# Patient Record
Sex: MALE | Race: WHITE | NOT HISPANIC OR LATINO | Employment: OTHER | URBAN - METROPOLITAN AREA
[De-identification: names, ages, dates, MRNs, and addresses within clinical notes are randomized per-mention and may not be internally consistent; named-entity substitution may affect disease eponyms.]

---

## 2017-04-05 ENCOUNTER — OFFICE VISIT (OUTPATIENT)
Dept: FAMILY MEDICINE | Facility: CLINIC | Age: 82
End: 2017-04-05
Payer: MEDICARE

## 2017-04-05 VITALS
DIASTOLIC BLOOD PRESSURE: 79 MMHG | WEIGHT: 206 LBS | OXYGEN SATURATION: 95 % | TEMPERATURE: 97.1 F | HEIGHT: 71 IN | SYSTOLIC BLOOD PRESSURE: 124 MMHG | HEART RATE: 80 BPM | BODY MASS INDEX: 28.84 KG/M2

## 2017-04-05 DIAGNOSIS — H61.23 BILATERAL IMPACTED CERUMEN: ICD-10-CM

## 2017-04-05 DIAGNOSIS — F03.B0 MODERATE DEMENTIA WITHOUT BEHAVIORAL DISTURBANCE (H): Primary | ICD-10-CM

## 2017-04-05 DIAGNOSIS — Z23 NEED FOR VACCINATION: ICD-10-CM

## 2017-04-05 PROCEDURE — 99202 OFFICE O/P NEW SF 15 MIN: CPT | Mod: 25 | Performed by: INTERNAL MEDICINE

## 2017-04-05 PROCEDURE — 90670 PCV13 VACCINE IM: CPT | Performed by: INTERNAL MEDICINE

## 2017-04-05 PROCEDURE — G0009 ADMIN PNEUMOCOCCAL VACCINE: HCPCS | Performed by: INTERNAL MEDICINE

## 2017-04-05 NOTE — PROGRESS NOTES
"  SUBJECTIVE:                                                    Raulito Iyer is a 86 year old male who presents to clinic today for the following health issues:      Chief Complaint   Patient presents with     [facility]         86 year old man who saw Dr. John once last summer according to his daughter.  He has moderate dementia according to his daughter.  He is moving to an [facility] and needs an admission form filled out.  He has no specific complaints.  His daughter thinks he had labs last year. We have no records today.      Problem list and histories reviewed & adjusted, as indicated.  Additional history: as documented    Patient Active Problem List   Diagnosis     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 behavioral disturbance     Past Surgical History:   Procedure Laterality Date     NO HISTORY OF SURGERY         Social History   Substance Use Topics     Smoking status: Former Smoker     Smokeless tobacco: Not on file     Alcohol use 1.8 - 3.6 oz/week     3 - 6 Standard drinks or equivalent per week     No family history on file.      Current Outpatient Prescriptions   Medication Sig Dispense Refill     NO ACTIVE MEDICATIONS         No Known Allergies    Reviewed and updated as needed this visit by clinical staff  Tobacco  Allergies  Meds  Problems  Soc Hx      Reviewed and updated as needed this visit by Provider         ROS:  An accurate ROS cannot be obtained due to memory loss     OBJECTIVE:                                                    /79  Pulse 80  Temp 97.1  F (36.2  C)  Ht 5' 11\" (1.803 m)  Wt 206 lb (93.4 kg)  SpO2 95%  BMI 28.73 kg/m2  Body mass index is 28.73 kg/(m^2).  GENERAL: healthy, alert and no distress  EYES: Eyes grossly normal to inspection, PERRL and conjunctivae and sclerae normal  HENT: ear canals and TM's normal after removal of bilateral cerumen impactions by myself, nose and mouth without ulcers or lesions  NECK: no adenopathy, no asymmetry, masses, or scars and " thyroid normal to palpation  RESP: lungs clear to auscultation - no rales, rhonchi or wheezes  CV: regular rate and rhythm, normal S1 S2, no S3 or S4, no murmur, click or rub, no peripheral edema and peripheral pulses strong  ABDOMEN: soft, nontender, no hepatosplenomegaly, no masses and bowel sounds normal  MS: no gross musculoskeletal defects noted, no edema  SKIN: no suspicious lesions or rashes  NEURO: Normal strength and tone, severe memory loss noted, repeats himself multiple times during the interview  PSYCH: Appropriate mood and affect, reasonably well-groomed, normal speech, severely impaired insight and judgment due to memory loss    Diagnostic Test Results:  none      ASSESSMENT/PLAN:                                                            1. 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 behavioral disturbance  Assisted living seems most appropriate, admission paperwork filled out    2. Bilateral impacted cerumen  For recurrent cerumen impactions, he should probably see otolaryngology since he has a great deal of difficulty remove cerumen in both external canals; contact information for Dr. Bales was provided to the patient's daughter  - OTOLARYNGOLOGY REFERRAL    3. Need for vaccination    - Pneumococcal vaccine 13 valent PCV13 IM (Prevnar) [71918]  - ADMIN: Vaccine, Initial ([id number])      I have requested records from [facility] internal medicine to insure that vaccinations are up-to-date  FUTURE APPOINTMENTS:       - Pending review of previous records and in no later than one year or sooner as needed    Sunny Yun MD  [facility]

## 2017-04-05 NOTE — MR AVS SNAPSHOT
After Visit Summary   4/5/2017    Raulito Iyer    MRN: [id number]           Patient Information     Date Of Birth          10/23/1930        Visit Information        Provider Department      4/5/2017 4:00 PM Sunny Yun MD [facility]        Today's Diagnoses     Bilateral impacted cerumen    -  1    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 behavioral disturbance           Follow-ups after your visit        Additional Services     OTOLARYNGOLOGY REFERRAL       Your provider has referred you to: N: [facility] [phone]   [email or web address]      Dr. Nelson Bales Ear specialist for wax removal       Please be aware that coverage of these services is subject to the terms and limitations of your health insurance plan.  Call member services at your health plan with any benefit or coverage questions.      Please bring the following with you to your appointment:    (1) Any X-Rays, CTs or MRIs which have been performed.  Contact the facility where they were done to arrange for  prior to your scheduled appointment.   (2) List of current medications  (3) This referral request   (4) Any documents/labs given to you for this referral                  Follow-up notes from your care team     Return in about 1 year (around 4/5/2018) for Physical Exam.      Who to contact     If you have questions or need follow up information about today's clinic visit or your schedule please contact [facility] directly at [phone].  Normal or non-critical lab and imaging results will be communicated to you by MyChart, letter or phone within 4 business days after the clinic has received the results. If you do not hear from us within 7 days, please contact the clinic through MyChart or phone. If you have a critical or abnormal lab result, we will notify you by phone as soon as possible.  Submit refill requests through [employer] or call your pharmacy and they  "will forward the refill request to us. Please allow 3 business days for your refill to be completed.          Additional Information About Your Visit        [employer] Information     [email or web address] lets you send messages to your doctor, view your test results, renew your prescriptions, schedule appointments and more. To sign up, go to [email or web address] . Click on \"Log in\" on the left side of the screen, which will take you to the Welcome page. Then click on \"Sign up Now\" on the right side of the page.     You will be asked to enter the access code listed below, as well as some personal information. Please follow the directions to create your username and password.     Your access code is: [email or web address]  Expires: 2017  4:42 PM     Your access code will  in 90 days. If you need help or a new code, please call your [place] clinic or [phone].        Care EveryWhere ID     This is your [facility] EveryWhere ID. This could be used by other organizations to access your [place] medical records  [id number]        Your Vitals Were     Pulse Temperature Height Pulse Oximetry BMI (Body Mass Index)       80 97.1  F (36.2  C) 5' 11\" (1.803 m) 95% 28.73 kg/m2        Blood Pressure from Last 3 Encounters:   17 124/79   13 156/86   12 138/70    Weight from Last 3 Encounters:   17 206 lb (93.4 kg)   13 182 lb (82.6 kg)   12 202 lb (91.6 kg)              We Performed the Following     OTOLARYNGOLOGY REFERRAL        Primary Care Provider Office Phone # Fax #    Sunny Yun [phone]       [facility] [address] [place] [address]        Thank you!     Thank you for choosing [facility]  for your care. Our goal is always to provide you with excellent care. Hearing back from our patients is one way we can continue to improve our services. Please take a few minutes to complete the written survey that you may receive in the mail after your visit " with us. Thank you!             Your Updated Medication List - Protect others around you: Learn how to safely use, store and throw away your medicines at www.disposemymeds.org.          This list is accurate as of: 4/5/17  4:42 PM.  Always use your most recent med list.                   Brand Name Dispense Instructions for use    NO ACTIVE MEDICATIONS

## 2017-04-27 ENCOUNTER — DOCUMENTATION ONLY (OUTPATIENT)
Dept: OTHER | Facility: CLINIC | Age: 82
End: 2017-04-27

## 2017-04-27 PROBLEM — Z71.89 ACP (ADVANCE CARE PLANNING): Chronic | Status: ACTIVE | Noted: 2017-04-27

## 2017-05-22 ENCOUNTER — DOCUMENTATION ONLY (OUTPATIENT)
Dept: OTHER | Facility: CLINIC | Age: 82
End: 2017-05-22

## 2017-05-22 DIAGNOSIS — Z71.89 ACP (ADVANCE CARE PLANNING): Chronic | ICD-10-CM

## 2017-07-24 ENCOUNTER — APPOINTMENT (OUTPATIENT)
Dept: MRI IMAGING | Facility: CLINIC | Age: 82
End: 2017-07-24
Attending: NURSE PRACTITIONER
Payer: MEDICARE

## 2017-07-24 ENCOUNTER — HOSPITAL ENCOUNTER (EMERGENCY)
Facility: CLINIC | Age: 82
Discharge: HOME | End: 2017-07-24
Attending: NURSE PRACTITIONER | Admitting: NURSE PRACTITIONER
Payer: MEDICARE

## 2017-07-24 VITALS
WEIGHT: 167 LBS | HEIGHT: 72 IN | RESPIRATION RATE: 16 BRPM | OXYGEN SATURATION: 97 % | DIASTOLIC BLOOD PRESSURE: 104 MMHG | SYSTOLIC BLOOD PRESSURE: 148 MMHG | BODY MASS INDEX: 22.62 KG/M2 | TEMPERATURE: 98.3 F

## 2017-07-24 DIAGNOSIS — R40.4 TRANSIENT ALTERATION OF AWARENESS: ICD-10-CM

## 2017-07-24 LAB
ALBUMIN UR-MCNC: NEGATIVE MG/DL
ANION GAP SERPL CALCULATED.3IONS-SCNC: 11 MMOL/L (ref 3–14)
APPEARANCE UR: CLEAR
APTT PPP: 35 SEC (ref 22–37)
BASOPHILS # BLD AUTO: 0.1 10E9/L (ref 0–0.2)
BASOPHILS NFR BLD AUTO: 0.6 %
BILIRUB UR QL STRIP: NEGATIVE
BUN SERPL-MCNC: 9 MG/DL (ref 7–30)
CALCIUM SERPL-MCNC: 8.8 MG/DL (ref 8.5–10.1)
CHLORIDE SERPL-SCNC: 107 MMOL/L (ref 94–109)
CO2 SERPL-SCNC: 24 MMOL/L (ref 20–32)
COLOR UR AUTO: YELLOW
CREAT SERPL-MCNC: 0.73 MG/DL (ref 0.66–1.25)
DIFFERENTIAL METHOD BLD: NORMAL
EOSINOPHIL # BLD AUTO: 0.1 10E9/L (ref 0–0.7)
EOSINOPHIL NFR BLD AUTO: 1.3 %
ERYTHROCYTE [DISTWIDTH] IN BLOOD BY AUTOMATED COUNT: 13.9 % (ref 10–15)
GFR SERPL CREATININE-BSD FRML MDRD: ABNORMAL ML/MIN/1.7M2
GLUCOSE SERPL-MCNC: 120 MG/DL (ref 70–99)
GLUCOSE UR STRIP-MCNC: NEGATIVE MG/DL
HCT VFR BLD AUTO: 48.9 % (ref 40–53)
HGB BLD-MCNC: 16.9 G/DL (ref 13.3–17.7)
HGB UR QL STRIP: NEGATIVE
IMM GRANULOCYTES # BLD: 0 10E9/L (ref 0–0.4)
IMM GRANULOCYTES NFR BLD: 0.4 %
INR PPP: 0.98 (ref 0.86–1.14)
INTERPRETATION ECG - MUSE: NORMAL
KETONES UR STRIP-MCNC: NEGATIVE MG/DL
LEUKOCYTE ESTERASE UR QL STRIP: NEGATIVE
LYMPHOCYTES # BLD AUTO: 2 10E9/L (ref 0.8–5.3)
LYMPHOCYTES NFR BLD AUTO: 20.8 %
MCH RBC QN AUTO: 32.2 PG (ref 26.5–33)
MCHC RBC AUTO-ENTMCNC: 34.6 G/DL (ref 31.5–36.5)
MCV RBC AUTO: 93 FL (ref 78–100)
MONOCYTES # BLD AUTO: 0.9 10E9/L (ref 0–1.3)
MONOCYTES NFR BLD AUTO: 9.3 %
NEUTROPHILS # BLD AUTO: 6.5 10E9/L (ref 1.6–8.3)
NEUTROPHILS NFR BLD AUTO: 67.6 %
NITRATE UR QL: NEGATIVE
NRBC # BLD AUTO: 0 10*3/UL
NRBC BLD AUTO-RTO: 0 /100
PH UR STRIP: 7 PH (ref 5–7)
PLATELET # BLD AUTO: 291 10E9/L (ref 150–450)
POTASSIUM SERPL-SCNC: 3.9 MMOL/L (ref 3.4–5.3)
RBC # BLD AUTO: 5.25 10E12/L (ref 4.4–5.9)
SODIUM SERPL-SCNC: 142 MMOL/L (ref 133–144)
SP GR UR STRIP: 1.01 (ref 1–1.03)
TROPONIN I SERPL-MCNC: NORMAL UG/L (ref 0–0.04)
URN SPEC COLLECT METH UR: NORMAL
UROBILINOGEN UR STRIP-ACNC: 1 EU/DL (ref 0.2–1)
WBC # BLD AUTO: 9.6 10E9/L (ref 4–11)

## 2017-07-24 PROCEDURE — 70549 MR ANGIOGRAPH NECK W/O&W/DYE: CPT

## 2017-07-24 PROCEDURE — A9585 GADOBUTROL INJECTION: HCPCS | Performed by: NURSE PRACTITIONER

## 2017-07-24 PROCEDURE — 93005 ELECTROCARDIOGRAM TRACING: CPT

## 2017-07-24 PROCEDURE — 25000128 H RX IP 250 OP 636: Performed by: NURSE PRACTITIONER

## 2017-07-24 PROCEDURE — 80048 BASIC METABOLIC PNL TOTAL CA: CPT | Performed by: NURSE PRACTITIONER

## 2017-07-24 PROCEDURE — 70553 MRI BRAIN STEM W/O & W/DYE: CPT

## 2017-07-24 PROCEDURE — 85610 PROTHROMBIN TIME: CPT | Performed by: NURSE PRACTITIONER

## 2017-07-24 PROCEDURE — 27210995 ZZH RX 272: Performed by: NURSE PRACTITIONER

## 2017-07-24 PROCEDURE — 81003 URINALYSIS AUTO W/O SCOPE: CPT | Performed by: NURSE PRACTITIONER

## 2017-07-24 PROCEDURE — 85730 THROMBOPLASTIN TIME PARTIAL: CPT | Performed by: NURSE PRACTITIONER

## 2017-07-24 PROCEDURE — 84484 ASSAY OF TROPONIN QUANT: CPT | Performed by: NURSE PRACTITIONER

## 2017-07-24 PROCEDURE — 99285 EMERGENCY DEPT VISIT HI MDM: CPT | Mod: 25

## 2017-07-24 PROCEDURE — 85025 COMPLETE CBC W/AUTO DIFF WBC: CPT | Performed by: NURSE PRACTITIONER

## 2017-07-24 PROCEDURE — 70544 MR ANGIOGRAPHY HEAD W/O DYE: CPT | Mod: XS

## 2017-07-24 RX ORDER — GADOBUTROL 604.72 MG/ML
10 INJECTION INTRAVENOUS ONCE
Status: COMPLETED | OUTPATIENT
Start: 2017-07-24 | End: 2017-07-24

## 2017-07-24 RX ORDER — ACYCLOVIR 200 MG/1
30 CAPSULE ORAL ONCE
Status: COMPLETED | OUTPATIENT
Start: 2017-07-24 | End: 2017-07-24

## 2017-07-24 RX ADMIN — SODIUM CHLORIDE 500 ML: 9 INJECTION, SOLUTION INTRAVENOUS at 16:24

## 2017-07-24 RX ADMIN — SODIUM CHLORIDE 30 ML: 9 INJECTION, SOLUTION INTRAMUSCULAR; INTRAVENOUS; SUBCUTANEOUS at 19:19

## 2017-07-24 RX ADMIN — GADOBUTROL 10 ML: 604.72 INJECTION INTRAVENOUS at 19:18

## 2017-07-24 ASSESSMENT — ENCOUNTER SYMPTOMS
SHORTNESS OF BREATH: 0
FACIAL ASYMMETRY: 0
HEADACHES: 0
DIFFICULTY URINATING: 0
SPEECH DIFFICULTY: 1

## 2017-07-24 NOTE — ED PROVIDER NOTES
History     Chief Complaint:  Altered Mental Status    HPI   Raulito Iyer is a 86 year old male with a history of dementia and LBBB who presents to the emergency department today for evaluation of altered mental status and is accompanied by his son. Per the patient's son, the patient's assisted living staff called, stating that the patient was unable to speak well, and had difficulty pulling his pants up when woken up at 1230 today. The patient was wearing the rest of his clothes already, so staff are unsure if the patient got up earlier in the morning. The patient normally sleeps until this time. Blood pressure was purportedly measured in the 180/80 range. When the patient's son arrived at 1330, he noticed slurred speech, but found the patient mentally intact and ambulatory without difficulty. He then notes that the patient was at baseline upon arrival here. Now, the patient's son notes slow and hoarse but otherwise coherent speech, normal face symmetry, and normal ambualtion. The patient states that he feels fine, can recognize his son, and denies any headache, chest pain, shortness of breath, trauma, or trouble urinating.    Allergies:  No Known Drug Allergies    Medications:    The patient is currently on no regular medications.      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 No pertinent past surgical history.    Family History:    History reviewed. No pertinent family history.     Social History:  The patient was accompanied to the ED by his son Cleve.  Smoking Status: Former smoker  Alcohol Use: Yes  Marital Status:   [4]     Review of Systems   Respiratory: Negative for shortness of breath.    Cardiovascular: Negative for chest pain.   Genitourinary: Negative for difficulty urinating.   Neurological: Positive for speech difficulty. Negative for facial asymmetry and headaches.   All other systems reviewed and are  negative.    Physical Exam   Vitals:  Patient Vitals for the past 24 hrs:   BP Temp Temp src Heart Rate Resp SpO2 Height Weight   07/24/17 1421 (!) 163/92 98.4  F (36.9  C) Oral 91 16 97 % 1.829 m (6') 75.8 kg (167 lb)     Physical Exam   Nursing notes reviewed. Vitals reviewed.  General: Alert. Well kept.  Eyes:  Conjunctiva non-injected, non-icteric.  Neck/Throat: Moist mucous membranes, oropharynx clear without erythema or exudate. No cervical lymphadenopathy.  Normal clear voice.  Cardiac: Regular rhythm. Normal heart sounds with no murmur/rubs/click. 2+ DP and radial pulses bilateral.  Pulmonary: Clear and equal breath sounds bilaterally. No crackles/rales. No wheezing  Abdomen: Soft. Non-distended. Non-tender to palpation. No masses. No guarding or rebound.  Musculoskeletal: Normal gross range of motion of all 4 extremities.    Skin: Warm and dry without rashes or petechiae. Normal appearance of visualized exposed skin.  Psych: Affect normal. Good eye contact.  Neurological: Cranial nerves II-XII intact. 5/5 strength equal bilateral upper and lower extremities.  Gait smooth.  Finger-nose-finger and rapid alternating thumb-finger coordinated and equal bilateral. Heel shin smooth and equal bilateral. Visual fields bilateral without deficit.  Alert and oriented to person, place and situation.  Able to state date of birth but unable to state date or month which is baseline for patient. Normal sensation, normal strength, and intact cranial nerves. No agitation. Displays no weakness, no atrophy, no tremor, facial symmetry, normal stance, normal speech and normal reflexes. No cranial nerve deficit or sensory deficit. Normal muscle tone. No pronator drift. GCS 15    Emergency Department Course     ECG:  ECG taken at 1614, ECG read by Dr. Franco at 1624  Normal sinus rhythm  Left bundle branch block  Abnormal ECG  Rate 73 bpm. VT interval 200. QRS duration 164. QT/QTc 438/482. P-R-T axes 78 -21  120.    Imaging:  Radiology findings were communicated with the patient and family who voiced understanding of the findings.    MR Neck w/o and w contrast  Normal MR angiogram of the neck.  Reading per radiology    MR Brain w/o and w contrast  Diffuse cerebral volume loss and cerebral white matter  changes consistent with chronic small vessel ischemic disease. No  evidence for acute intracranial pathology.   Reading per radiology    MR Head w/o contrast angiogram  Normal MR angiogram of the head.  Reading per radiology    Laboratory:  Laboratory findings were communicated with the patient and family who voiced understanding of the findings.    CBC: AWNL. (WBC 9.6, HGB 16.9, )   BMP: Glucose 120 (H) o/w WNL (Creatinine 0.73)  INR: 0.98  PTT: 35  Troponin (Collected 1605): <0.015    UA with micro: Negative    Interventions:  1624 ns 1L IV  1918 gadavist 10 mL IV     Emergency Department Course:  Nursing notes and vitals reviewed.  I performed an exam of the patient as documented above.   IV was inserted and blood was drawn for laboratory testing, results above.  The patient was sent for a MR Neck w/o and w contrast, MR Brain w/o and w contrast, and MR Head w/o contrast angiogram while in the emergency department, results above.   The patient provided a urine sample here in the emergency department. This was sent for laboratory testing, findings above.  1651: I spoke with Dr. Kami Chin of the neurology service from the [facility] regarding patient's presentation, findings, and plan of care and she recommends MRI rather than CT.  At 1651 the patient was rechecked and patient and family were updated on the plan of care.  1950: I paged neurology.  At 2002 the patient was rechecked and was updated on the results of laboratory and imaging studies.   8:06 PM: I spoke with Dr. Long of the neurology service from [facility] regarding patient's presentation, findings, and  plan of care.  At 2010 the patient was rechecked and family was updated on the plan of care.  I discussed the treatment plan with the patient and his family. They expressed understanding of this plan and consented to discharge. He will be discharged home with instructions for care and follow up. In addition, the patient will return to the emergency department if their symptoms persist, worsen, if new symptoms arise or if there is any concern.  All questions were answered.  I personally reviewed the laboratory and imaging results with the patient and family and answered all related questions prior to discharge.    Impression & Plan      Medical Decision Making:  Raulito Iyer is a 86 year old male who presents for evaluation of altered mental status. Differential diagnosis includes stroke, seizure, electrolyte abnormality, dementia, medication reaction, infectious source. On examination, the patient currently has an intact neurologic status. He does have baseline dementia which is without change from his normal per family report. Neurologic exam is intact, he has no pronator drift, and he has no facial droop. Laboratory work returned reassuring and patient is without a urine infection. EKG and troponin also show no acute changes, although patient has a known left bundle branch block. I spoke with Dr. Cihn of neurology who recommended MRI/MRA of the head and neck which were obtained and negative for any acute findings. I discussed these results with Dr. Long who recommended patient follow up this week in clinic. I discussed these results with the patient, his son and daughter who were both in agreement with the plan. No indication for admission today. He will be discharged home in the care of his family and will follow up with both primary care in one day and neurology this week.    Diagnosis:    ICD-10-CM    1. Transient alteration of awareness R40.4     resolved     Disposition:   Home    Scribe  Disclosure:  I, Jamaal Thakkar, am serving as a scribe at 4:03 PM on 7/24/2017 to document services personally performed by Chen Alfred CNP, based on my observations and the provider's statements to me.    7/24/2017    EMERGENCY DEPARTMENT       Chen Alfred CNP  07/24/17 [id number]

## 2017-07-24 NOTE — ED NOTES
Patient's son reports to triage desk stating patient's speech seems slurred again. Assessed patient - no facial droop, no arm drift, no weakness.  Pt states feels normal.  Son states he can hear his speech slightly slurred.

## 2017-07-24 NOTE — ED AVS SNAPSHOT
Emergency Department    [address] [facility] [address]    Phone:  [phone]    Fax:  [phone]                                       Raulito Iyer   MRN: [id number]    Department:   Emergency Department   Date of Visit:  7/24/2017           Patient Information     Date Of Birth          10/23/1930        Your diagnoses for this visit were:     Transient alteration of awareness resolved       You were seen by Chen Alfred, CNP.      Follow-up Information     Follow up with Sunny Yun MD In 1 day.    Specialty:  Internal Medicine    Contact information:    [place] [address] [address] [facility] [address] [phone]          Follow up with Neurology, [facility] In 3 days.    Contact information:    [address]  [address]  [facility] [address] [phone]          Follow up with  Emergency Department.    Specialty:  EMERGENCY MEDICINE    Why:  As needed, If symptoms worsen    Contact information:    [address] [facility] [address] [phone]        Discharge Instructions         Altered Level of Consciousness  Level of consciousness (LOC) is a measure of a person s ability to interact with other people and to react to the surroundings. A person with an altered level of consciousness may not respond to touch or voices. He or she may look vacant or blank and may not make eye contact with others. He or she may be limp and may not move for a long time or show little interest in moving. He or she may also be confused.  There are many causes of altered LOC. They include low blood sugar, infection, medicines, injuries, or other medical problems.  Altered LOC is a medical emergency. The healthcare provider will do tests to help find the cause. These may include blood tests and imaging tests. The person is treated so breathing and heart rate are stable. An intravenous (IV) line may be put into a vein in the arm or hand to give  medicines. Once the cause of altered LOC is found, the goal is to treat the cause.  In almost all cases, the person will be admitted to the hospital for observation.  Home care  When your loved one is released from the hospital, you will be given guidelines for caring for him or her. In general:    Follow the healthcare provider's instructions for giving any prescribed medicines to your child.    Stay with your loved one or have another responsible adult look after him or her. Watch carefully for any return of symptoms or changes in behavior.    If the person has diabetes, make sure that any approved medicines are given on time and as prescribed.  Follow-up care  Follow up with the healthcare provider or our staff.  When to seek medical advice  Call the healthcare provider right away for any of the following:    Symptoms of altered LOC return    New symptoms appear  Date Last Reviewed: 8/23/2015 2000-2017 [employer]. [address] [place] [address]. All rights reserved. This information is not intended as a substitute for professional medical care. Always follow your healthcare professional's instructions.          24 Hour Appointment Hotline       To make an appointment at any [facility], call [phone] ([phone]). If you don't have a family doctor or clinic, we will help you find one. [facility] clinics are conveniently located to serve the needs of you and your family.             Review of your medicines      Our records show that you are taking the medicines listed below. If these are incorrect, please call your family doctor or clinic.        Dose / Directions Last dose taken    NO ACTIVE MEDICATIONS        Refills:  0                Procedures and tests performed during your visit     Basic metabolic panel    CBC with platelets differential    EKG 12-lead, tracing only    INR    MR Brain w/o & w Contrast    MR Head w/o Contrast Angiogram    MR Neck w/o & w Contrast  Angiogram    Partial thromboplastin time    Troponin I      Orders Needing Specimen Collection     Ordered          07/24/17 1613  *UA reflex to Microscopic (ED Lab POCT Only 3-11) - STAT, Pri[facility]: STAT, Needs to be Collected     Scheduled Task Status   07/24/17 1613 Collect *UA reflex to Microscopic (ED Lab POCT Only 3-11) Open   Order Class:  PCU Collect                  Pending Results     Date and Time Order Name Status Description    7/24/2017 1651 MR Neck w/o & w Contrast Angiogram Preliminary     7/24/2017 1651 MR Head w/o Contrast Angiogram Preliminary     7/24/2017 1651 MR Brain w/o & w Contrast Preliminary             Pending Culture Results     No orders found from 7/22/2017 to 7/25/2017.            Pending Results Instructions     If you had any lab results that were not finalized at the time of your Discharge, you can call the ED Lab Result RN at [phone]. You will be contacted by this team for any positive Lab results or changes in treatment. The nurses are available 7 days a week from 10A to 6:30P.  You can leave a message 24 hours per day and they will return your call.        Test Results From Your Hospital Stay        7/24/2017  4:24 PM      Component Results     Component Value Ref Range & Units Status    WBC 9.6 4.0 - 11.0 10e9/L Final    RBC Count 5.25 4.4 - 5.9 10e12/L Final    Hemoglobin 16.9 13.3 - 17.7 g/dL Final    Hematocrit 48.9 40.0 - 53.0 % Final    MCV 93 78 - 100 fl Final    MCH 32.2 26.5 - 33.0 pg Final    MCHC 34.6 31.5 - 36.5 g/dL Final    RDW 13.9 10.0 - 15.0 % Final    Platelet Count 291 150 - 450 10e9/L Final    Diff Method Automated Method  Final    % Neutrophils 67.6 % Final    % Lymphocytes 20.8 % Final    % Monocytes 9.3 % Final    % Eosinophils 1.3 % Final    % Basophils 0.6 % Final    % Immature Granulocytes 0.4 % Final    Nucleated RBCs 0 0 /100 Final    Absolute Neutrophil 6.5 1.6 - 8.3 10e9/L Final    Absolute Lymphocytes 2.0 0.8 - 5.3 10e9/L Final    Absolute  Monocytes 0.9 0.0 - 1.3 10e9/L Final    Absolute Eosinophils 0.1 0.0 - 0.7 10e9/L Final    Absolute Basophils 0.1 0.0 - 0.2 10e9/L Final    Abs Immature Granulocytes 0.0 0 - 0.4 10e9/L Final    Absolute Nucleated RBC 0.0  Final         7/24/2017  6:43 PM      Component Results     Component Value Ref Range & Units Status    Sodium 142 133 - 144 mmol/L Final    Potassium 3.9 3.4 - 5.3 mmol/L Final    Chloride 107 94 - 109 mmol/L Final    Carbon Dioxide 24 20 - 32 mmol/L Final    Anion Gap 11 3 - 14 mmol/L Final    Glucose 120 (H) 70 - 99 mg/dL Final    Urea Nitrogen 9 7 - 30 mg/dL Final    Creatinine 0.73 0.66 - 1.25 mg/dL Final    GFR Estimate >90  Non  GFR Calc   >60 mL/min/1.7m2 Final    GFR Estimate If Black >90   GFR Calc   >60 mL/min/1.7m2 Final    Calcium 8.8 8.5 - 10.1 mg/dL Final         7/24/2017  4:38 PM      Component Results     Component Value Ref Range & Units Status    INR 0.98 0.86 - 1.14 Final         7/24/2017  4:38 PM      Component Results     Component Value Ref Range & Units Status    PTT 35 22 - 37 sec Final         7/24/2017  6:43 PM      Component Results     Component Value Ref Range & Units Status    Troponin I ES  0.000 - 0.045 ug/L Final    <0.015  The 99th percentile for upper reference range is 0.045 ug/L.  Troponin values in   the range of 0.045 - 0.120 ug/L may be associated with risks of adverse   clinical events.           7/24/2017  7:30 PM      Narrative     MRI OF THE BRAIN WITHOUT AND WITH CONTRAST 7/24/2017 7:23 PM     COMPARISON: None.    HISTORY:  Slurred speech.     TECHNIQUE: Axial diffusion-weighted with ADC map, axial T2-weighted  with fat saturation, axial T1-weighted, axial turboFLAIR and coronal  T1-weighted images of the brain were acquired without intravenous  contrast.  Following intravenous administration of gadolinium (10 mL  Gadavist), axial T1-weighted images of the brain were acquired.     FINDINGS: There is moderate diffuse  cerebral volume loss. There are  numerous scattered focal and confluent periventricular areas of  abnormal T2 signal hyperintensity in the cerebral white matter  bilaterally that are consistent with sequela of chronic small vessel  ischemic disease.    The ventricles and basal cisterns are within normal limits in  configuration given the degree of cerebral volume loss.  There is no  midline shift.  There are no extra-axial fluid collections.  There is  no evidence for stroke or acute intracranial hemorrhage.  There is no  abnormal contrast enhancement in the brain or its coverings.    There is no sinusitis or mastoiditis.        Impression     IMPRESSION: Diffuse cerebral volume loss and cerebral white matter  changes consistent with chronic small vessel ischemic disease. No  evidence for acute intracranial pathology.          7/24/2017  7:30 PM      Narrative     MR ANGIOGRAM OF THE HEAD WITHOUT CONTRAST   7/24/2017 7:22 PM     COMPARISON: None.    HISTORY: Slurred speech.    TECHNIQUE:  3D time-of-flight MR angiogram of the head without  contrast. MIP reconstruction of all MR angiographic data was  performed.    FINDINGS:  The bilateral distal internal carotid, basilar, bilateral  anterior cerebral, bilateral middle cerebral and bilateral posterior  cerebral arteries are patent and unremarkable with no evidence for  cerebral artery stenosis or aneurysm. The anterior communicating  artery is patent and unremarkable.         Impression     IMPRESSION:  Normal MR angiogram of the head.                7/24/2017  7:44 PM      Narrative     MRA NECK WITHOUT AND WITH CONTRAST  7/24/2017 7:38 PM     COMPARISON: None.    HISTORY: Evaluate for dissection, vertebrobasilar flow, carotid  stenosis.    TECHNIQUE: 2D time-of-flight MR angiogram of the neck without contrast  and 3D MR angiogram of the neck with 10 mL Gadavist gadolinium IV  contrast.  MIP reconstruction of all MR angiographic data was  performed. Estimates of  carotid stenoses are made relative to the  distal internal carotid artery diameters except as noted.    FINDINGS:    Carotids: The common carotid arteries bilaterally are widely patent.  The cervical internal carotid arteries bilaterally are patent without  stenosis.    Vertebrals: The vertebral arteries bilaterally are patent without  stenosis and demonstrate antegrade flow.    Aortic arch: The arteries as they arise from the aortic arch are  normally arranged with no evidence for stenosis.        Impression     IMPRESSION:    Normal MR angiogram of the neck.                Clinical Quality Measure: Blood Pressure Screening     Your blood pressure was checked while you were in the emergency department today. The last reading we obtained was  BP: (!) 163/92 . Please read the guidelines below about what these numbers mean and what you should do about them.  If your systolic blood pressure (the top number) is less than 120 and your diastolic blood pressure (the bottom number) is less than 80, then your blood pressure is normal. There is nothing more that you need to do about it.  If your systolic blood pressure (the top number) is 120-139 or your diastolic blood pressure (the bottom number) is 80-89, your blood pressure may be higher than it should be. You should have your blood pressure rechecked within a year by a primary care provider.  If your systolic blood pressure (the top number) is 140 or greater or your diastolic blood pressure (the bottom number) is 90 or greater, you may have high blood pressure. High blood pressure is treatable, but if left untreated over time it can put you at risk for heart attack, stroke, or kidney failure. You should have your blood pressure rechecked by a primary care provider within the next 4 weeks.  If your provider in the emergency department today gave you specific instructions to follow-up with your doctor or provider even sooner than that, you should follow that instruction  "and not wait for up to 4 weeks for your follow-up visit.        Thank you for choosing [facility]       Thank you for choosing [facility] for your care. Our goal is always to provide you with excellent care. Hearing back from our patients is one way we can continue to improve our services. Please take a few minutes to complete the written survey that you may receive in the mail after you visit with us. Thank you!        [employer] Information     [employer] lets you send messages to your doctor, view your test results, renew your prescriptions, schedule appointments and more. To sign up, go to [email or web address] . Click on \"Log in\" on the left side of the screen, which will take you to the Welcome page. Then click on \"Sign up Now\" on the right side of the page.     You will be asked to enter the access code listed below, as well as some personal information. Please follow the directions to create your username and password.     Your access code is: [email or web address]  Expires: 10/22/2017  8:13 PM     Your access code will  in 90 days. If you need help or a new code, please call your [facility] clinic or [phone].        Care EveryWhere ID     This is your Care EveryWhere ID. This could be used by other organizations to access your [facility] medical records  [id number]        Equal Access to Services     WENDIE LARA : Hadii eugenia Harp, waaxda luqadaha, qaybta kaalmada gwen, delroy damico . So [facility] [phone].    ATENCIÓN: Si habla español, tiene a frazier disposición servicios gratuitos de asistencia lingüística. Llame al [phone].    We comply with applicable federal civil rights laws and [place] laws. We do not discriminate on the basis of race, color, national origin, age, disability sex, sexual orientation or gender identity.            After Visit Summary       This is your record. Keep this with you and show to your community pharmacist(s) and doctor(s) at your " next visit.

## 2017-07-24 NOTE — ED AVS SNAPSHOT
Emergency Department    [address] [facility] [address]    Phone:  [phone]    Fax:  [phone]                                       Raulito Iyer   MRN: [id number]    Department:   Emergency Department   Date of Visit:  7/24/2017           After Visit Summary Signature Page     I have received my discharge instructions, and my questions have been answered. I have discussed any challenges I see with this plan with the nurse or doctor.    ..........................................................................................................................................  Patient/Patient Representative Signature      ..........................................................................................................................................  Patient Representative Print Name and Relationship to Patient    ..................................................               ................................................  Date                                            Time    ..........................................................................................................................................  Reviewed by Signature/Title    ...................................................              ..............................................  Date                                                            Time

## 2017-07-25 NOTE — DISCHARGE INSTRUCTIONS
Altered Level of Consciousness  Level of consciousness (LOC) is a measure of a person s ability to interact with other people and to react to the surroundings. A person with an altered level of consciousness may not respond to touch or voices. He or she may look vacant or blank and may not make eye contact with others. He or she may be limp and may not move for a long time or show little interest in moving. He or she may also be confused.  There are many causes of altered LOC. They include low blood sugar, infection, medicines, injuries, or other medical problems.  Altered LOC is a medical emergency. The healthcare provider will do tests to help find the cause. These may include blood tests and imaging tests. The person is treated so breathing and heart rate are stable. An intravenous (IV) line may be put into a vein in the arm or hand to give medicines. Once the cause of altered LOC is found, the goal is to treat the cause.  In almost all cases, the person will be admitted to the hospital for observation.  Home care  When your loved one is released from the hospital, you will be given guidelines for caring for him or her. In general:    Follow the healthcare provider's instructions for giving any prescribed medicines to your child.    Stay with your loved one or have another responsible adult look after him or her. Watch carefully for any return of symptoms or changes in behavior.    If the person has diabetes, make sure that any approved medicines are given on time and as prescribed.  Follow-up care  Follow up with the healthcare provider or our staff.  When to seek medical advice  Call the healthcare provider right away for any of the following:    Symptoms of altered LOC return    New symptoms appear  Date Last Reviewed: 8/23/2015 2000-2017 [employer]. [address] [place] [address], [place] [address]. All rights reserved. This information is not intended as a substitute for professional medical  care. Always follow your healthcare professional's instructions.

## 2017-09-13 ENCOUNTER — DOCUMENTATION ONLY (OUTPATIENT)
Dept: OTHER | Facility: CLINIC | Age: 82
End: 2017-09-13

## 2017-09-13 DIAGNOSIS — Z71.89 ACP (ADVANCE CARE PLANNING): Chronic | ICD-10-CM

## 2018-07-03 ENCOUNTER — RECORDS - HEALTHEAST (OUTPATIENT)
Dept: LAB | Facility: CLINIC | Age: 83
End: 2018-07-03

## 2018-07-03 LAB
ANION GAP SERPL CALCULATED.3IONS-SCNC: 7 MMOL/L (ref 5–18)
BUN SERPL-MCNC: 10 MG/DL (ref 8–28)
CALCIUM SERPL-MCNC: 8.9 MG/DL (ref 8.5–10.5)
CHLORIDE BLD-SCNC: 107 MMOL/L (ref 98–107)
CO2 SERPL-SCNC: 30 MMOL/L (ref 22–31)
CREAT SERPL-MCNC: 0.75 MG/DL (ref 0.7–1.3)
ERYTHROCYTE [DISTWIDTH] IN BLOOD BY AUTOMATED COUNT: 14.5 % (ref 11–14.5)
GFR SERPL CREATININE-BSD FRML MDRD: >60 ML/MIN/1.73M2
GLUCOSE BLD-MCNC: 95 MG/DL (ref 70–125)
HCT VFR BLD AUTO: 46.3 % (ref 40–54)
HGB BLD-MCNC: 14.8 G/DL (ref 14–18)
MCH RBC QN AUTO: 31.3 PG (ref 27–34)
MCHC RBC AUTO-ENTMCNC: 32 G/DL (ref 32–36)
MCV RBC AUTO: 98 FL (ref 80–100)
PLATELET # BLD AUTO: 302 THOU/UL (ref 140–440)
PMV BLD AUTO: 10 FL (ref 8.5–12.5)
POTASSIUM BLD-SCNC: 3.5 MMOL/L (ref 3.5–5)
RBC # BLD AUTO: 4.73 MILL/UL (ref 4.4–6.2)
SODIUM SERPL-SCNC: 144 MMOL/L (ref 136–145)
TSH SERPL DL<=0.005 MIU/L-ACNC: 1.47 UIU/ML (ref 0.3–5)
WBC: 8.4 THOU/UL (ref 4–11)

## 2018-07-04 LAB — 25(OH)D3 SERPL-MCNC: 11.5 NG/ML (ref 30–80)

## 2019-01-14 ENCOUNTER — RECORDS - HEALTHEAST (OUTPATIENT)
Dept: LAB | Facility: CLINIC | Age: 84
End: 2019-01-14

## 2019-01-15 LAB
ANION GAP SERPL CALCULATED.3IONS-SCNC: 7 MMOL/L (ref 5–18)
BUN SERPL-MCNC: 10 MG/DL (ref 8–28)
CALCIUM SERPL-MCNC: 8.5 MG/DL (ref 8.5–10.5)
CHLORIDE BLD-SCNC: 107 MMOL/L (ref 98–107)
CO2 SERPL-SCNC: 28 MMOL/L (ref 22–31)
CREAT SERPL-MCNC: 0.71 MG/DL (ref 0.7–1.3)
ERYTHROCYTE [DISTWIDTH] IN BLOOD BY AUTOMATED COUNT: 14.1 % (ref 11–14.5)
GFR SERPL CREATININE-BSD FRML MDRD: >60 ML/MIN/1.73M2
GLUCOSE BLD-MCNC: 86 MG/DL (ref 70–125)
HCT VFR BLD AUTO: 44.6 % (ref 40–54)
HGB BLD-MCNC: 14.4 G/DL (ref 14–18)
MCH RBC QN AUTO: 31.2 PG (ref 27–34)
MCHC RBC AUTO-ENTMCNC: 32.3 G/DL (ref 32–36)
MCV RBC AUTO: 97 FL (ref 80–100)
PLATELET # BLD AUTO: 312 THOU/UL (ref 140–440)
PMV BLD AUTO: 9.9 FL (ref 8.5–12.5)
POTASSIUM BLD-SCNC: 3.8 MMOL/L (ref 3.5–5)
RBC # BLD AUTO: 4.61 MILL/UL (ref 4.4–6.2)
SODIUM SERPL-SCNC: 142 MMOL/L (ref 136–145)
WBC: 8 THOU/UL (ref 4–11)

## 2020-02-04 ENCOUNTER — RECORDS - HEALTHEAST (OUTPATIENT)
Dept: LAB | Facility: CLINIC | Age: 85
End: 2020-02-04

## 2020-02-04 LAB
ANION GAP SERPL CALCULATED.3IONS-SCNC: 9 MMOL/L (ref 5–18)
BASOPHILS # BLD AUTO: 0.1 THOU/UL (ref 0–0.2)
BASOPHILS NFR BLD AUTO: 1 % (ref 0–2)
BUN SERPL-MCNC: 12 MG/DL (ref 8–28)
CALCIUM SERPL-MCNC: 9.1 MG/DL (ref 8.5–10.5)
CHLORIDE BLD-SCNC: 107 MMOL/L (ref 98–107)
CO2 SERPL-SCNC: 29 MMOL/L (ref 22–31)
CREAT SERPL-MCNC: 0.75 MG/DL (ref 0.7–1.3)
EOSINOPHIL # BLD AUTO: 0.3 THOU/UL (ref 0–0.4)
EOSINOPHIL NFR BLD AUTO: 3 % (ref 0–6)
ERYTHROCYTE [DISTWIDTH] IN BLOOD BY AUTOMATED COUNT: 13.8 % (ref 11–14.5)
GFR SERPL CREATININE-BSD FRML MDRD: >60 ML/MIN/1.73M2
GLUCOSE BLD-MCNC: 86 MG/DL (ref 70–125)
HCT VFR BLD AUTO: 49.7 % (ref 40–54)
HGB BLD-MCNC: 16 G/DL (ref 14–18)
LYMPHOCYTES # BLD AUTO: 3.8 THOU/UL (ref 0.8–4.4)
LYMPHOCYTES NFR BLD AUTO: 38 % (ref 20–40)
MCH RBC QN AUTO: 31.1 PG (ref 27–34)
MCHC RBC AUTO-ENTMCNC: 32.2 G/DL (ref 32–36)
MCV RBC AUTO: 97 FL (ref 80–100)
MONOCYTES # BLD AUTO: 1 THOU/UL (ref 0–0.9)
MONOCYTES NFR BLD AUTO: 10 % (ref 2–10)
NEUTROPHILS # BLD AUTO: 4.7 THOU/UL (ref 2–7.7)
NEUTROPHILS NFR BLD AUTO: 47 % (ref 50–70)
PLATELET # BLD AUTO: 347 THOU/UL (ref 140–440)
PMV BLD AUTO: 10 FL (ref 8.5–12.5)
POTASSIUM BLD-SCNC: 4.2 MMOL/L (ref 3.5–5)
RBC # BLD AUTO: 5.14 MILL/UL (ref 4.4–6.2)
SODIUM SERPL-SCNC: 145 MMOL/L (ref 136–145)
TSH SERPL DL<=0.005 MIU/L-ACNC: 1.08 UIU/ML (ref 0.3–5)
WBC: 9.9 THOU/UL (ref 4–11)

## 2020-09-23 ENCOUNTER — HOSPITAL ENCOUNTER (INPATIENT)
Facility: CLINIC | Age: 85
LOS: 3 days | Discharge: NURSING, REHAB OR LONG-TERM CARE | DRG: 481 | End: 2020-09-26
Attending: EMERGENCY MEDICINE | Admitting: HOSPITALIST
Payer: MEDICARE

## 2020-09-23 ENCOUNTER — APPOINTMENT (OUTPATIENT)
Dept: CT IMAGING | Facility: CLINIC | Age: 85
DRG: 481 | End: 2020-09-23
Attending: EMERGENCY MEDICINE
Payer: MEDICARE

## 2020-09-23 ENCOUNTER — APPOINTMENT (OUTPATIENT)
Dept: GENERAL RADIOLOGY | Facility: CLINIC | Age: 85
DRG: 481 | End: 2020-09-23
Attending: EMERGENCY MEDICINE
Payer: MEDICARE

## 2020-09-23 DIAGNOSIS — S72.001A CLOSED FRACTURE OF NECK OF RIGHT FEMUR, INITIAL ENCOUNTER (H): ICD-10-CM

## 2020-09-23 DIAGNOSIS — S80.211A ABRASION OF RIGHT KNEE, INITIAL ENCOUNTER: ICD-10-CM

## 2020-09-23 DIAGNOSIS — D72.829 LEUKOCYTOSIS, UNSPECIFIED TYPE: ICD-10-CM

## 2020-09-23 DIAGNOSIS — G45.9 TIA (TRANSIENT ISCHEMIC ATTACK): ICD-10-CM

## 2020-09-23 DIAGNOSIS — K59.00 CONSTIPATION, UNSPECIFIED CONSTIPATION TYPE: Primary | ICD-10-CM

## 2020-09-23 PROBLEM — S72.009A FEMORAL NECK FRACTURE (H): Status: ACTIVE | Noted: 2020-09-23

## 2020-09-23 LAB
ALBUMIN UR-MCNC: 30 MG/DL
ANION GAP SERPL CALCULATED.3IONS-SCNC: 9 MMOL/L (ref 3–14)
APPEARANCE UR: CLEAR
APTT PPP: 33 SEC (ref 22–37)
BASOPHILS # BLD AUTO: 0 10E9/L (ref 0–0.2)
BASOPHILS NFR BLD AUTO: 0.2 %
BILIRUB UR QL STRIP: NEGATIVE
BUN SERPL-MCNC: 18 MG/DL (ref 7–30)
CALCIUM SERPL-MCNC: 9 MG/DL (ref 8.5–10.1)
CHLORIDE SERPL-SCNC: 109 MMOL/L (ref 94–109)
CO2 SERPL-SCNC: 24 MMOL/L (ref 20–32)
COLOR UR AUTO: YELLOW
CREAT SERPL-MCNC: 0.85 MG/DL (ref 0.66–1.25)
DIFFERENTIAL METHOD BLD: ABNORMAL
EOSINOPHIL # BLD AUTO: 0 10E9/L (ref 0–0.7)
EOSINOPHIL NFR BLD AUTO: 0.1 %
ERYTHROCYTE [DISTWIDTH] IN BLOOD BY AUTOMATED COUNT: 14 % (ref 10–15)
GFR SERPL CREATININE-BSD FRML MDRD: 77 ML/MIN/{1.73_M2}
GLUCOSE SERPL-MCNC: 174 MG/DL (ref 70–99)
GLUCOSE UR STRIP-MCNC: NEGATIVE MG/DL
HCT VFR BLD AUTO: 49.9 % (ref 40–53)
HGB BLD-MCNC: 16.6 G/DL (ref 13.3–17.7)
HGB UR QL STRIP: ABNORMAL
IMM GRANULOCYTES # BLD: 0.1 10E9/L (ref 0–0.4)
IMM GRANULOCYTES NFR BLD: 0.5 %
INR PPP: 1.23 (ref 0.86–1.14)
INTERPRETATION ECG - MUSE: NORMAL
KETONES UR STRIP-MCNC: 40 MG/DL
LABORATORY COMMENT REPORT: NORMAL
LEUKOCYTE ESTERASE UR QL STRIP: ABNORMAL
LYMPHOCYTES # BLD AUTO: 1.4 10E9/L (ref 0.8–5.3)
LYMPHOCYTES NFR BLD AUTO: 7.3 %
MCH RBC QN AUTO: 31.7 PG (ref 26.5–33)
MCHC RBC AUTO-ENTMCNC: 33.3 G/DL (ref 31.5–36.5)
MCV RBC AUTO: 95 FL (ref 78–100)
MONOCYTES # BLD AUTO: 0.5 10E9/L (ref 0–1.3)
MONOCYTES NFR BLD AUTO: 2.4 %
MUCOUS THREADS #/AREA URNS LPF: PRESENT /LPF
NEUTROPHILS # BLD AUTO: 17.3 10E9/L (ref 1.6–8.3)
NEUTROPHILS NFR BLD AUTO: 89.5 %
NITRATE UR QL: NEGATIVE
NRBC # BLD AUTO: 0 10*3/UL
NRBC BLD AUTO-RTO: 0 /100
PH UR STRIP: 6 PH (ref 5–7)
PLATELET # BLD AUTO: 294 10E9/L (ref 150–450)
POTASSIUM SERPL-SCNC: 3.8 MMOL/L (ref 3.4–5.3)
RBC # BLD AUTO: 5.24 10E12/L (ref 4.4–5.9)
RBC #/AREA URNS AUTO: 2 /HPF (ref 0–2)
SARS-COV-2 RNA SPEC QL NAA+PROBE: NEGATIVE
SARS-COV-2 RNA SPEC QL NAA+PROBE: NORMAL
SODIUM SERPL-SCNC: 142 MMOL/L (ref 133–144)
SOURCE: ABNORMAL
SP GR UR STRIP: 1.02 (ref 1–1.03)
SPECIMEN SOURCE: NORMAL
SPECIMEN SOURCE: NORMAL
SQUAMOUS #/AREA URNS AUTO: 2 /HPF (ref 0–1)
UROBILINOGEN UR STRIP-MCNC: 2 MG/DL (ref 0–2)
WBC # BLD AUTO: 19.3 10E9/L (ref 4–11)
WBC #/AREA URNS AUTO: 3 /HPF (ref 0–5)

## 2020-09-23 PROCEDURE — 90715 TDAP VACCINE 7 YRS/> IM: CPT | Performed by: EMERGENCY MEDICINE

## 2020-09-23 PROCEDURE — 71046 X-RAY EXAM CHEST 2 VIEWS: CPT

## 2020-09-23 PROCEDURE — 70450 CT HEAD/BRAIN W/O DYE: CPT

## 2020-09-23 PROCEDURE — 25800030 ZZH RX IP 258 OP 636: Performed by: NURSE PRACTITIONER

## 2020-09-23 PROCEDURE — 99223 1ST HOSP IP/OBS HIGH 75: CPT | Mod: AI | Performed by: NURSE PRACTITIONER

## 2020-09-23 PROCEDURE — 81001 URINALYSIS AUTO W/SCOPE: CPT | Performed by: EMERGENCY MEDICINE

## 2020-09-23 PROCEDURE — 25000128 H RX IP 250 OP 636: Performed by: EMERGENCY MEDICINE

## 2020-09-23 PROCEDURE — C9803 HOPD COVID-19 SPEC COLLECT: HCPCS

## 2020-09-23 PROCEDURE — 12000000 ZZH R&B MED SURG/OB

## 2020-09-23 PROCEDURE — 80048 BASIC METABOLIC PNL TOTAL CA: CPT | Performed by: EMERGENCY MEDICINE

## 2020-09-23 PROCEDURE — 99285 EMERGENCY DEPT VISIT HI MDM: CPT | Mod: 25

## 2020-09-23 PROCEDURE — 0042T CT HEAD PERFUSION WITH CONTRAST: CPT

## 2020-09-23 PROCEDURE — 72192 CT PELVIS W/O DYE: CPT

## 2020-09-23 PROCEDURE — 85610 PROTHROMBIN TIME: CPT | Performed by: EMERGENCY MEDICINE

## 2020-09-23 PROCEDURE — 72170 X-RAY EXAM OF PELVIS: CPT

## 2020-09-23 PROCEDURE — 93005 ELECTROCARDIOGRAM TRACING: CPT

## 2020-09-23 PROCEDURE — 99291 CRITICAL CARE FIRST HOUR: CPT | Performed by: PSYCHIATRY & NEUROLOGY

## 2020-09-23 PROCEDURE — 25000125 ZZHC RX 250: Performed by: EMERGENCY MEDICINE

## 2020-09-23 PROCEDURE — 90471 IMMUNIZATION ADMIN: CPT

## 2020-09-23 PROCEDURE — 85730 THROMBOPLASTIN TIME PARTIAL: CPT | Performed by: EMERGENCY MEDICINE

## 2020-09-23 PROCEDURE — 70498 CT ANGIOGRAPHY NECK: CPT

## 2020-09-23 PROCEDURE — 85025 COMPLETE CBC W/AUTO DIFF WBC: CPT | Performed by: EMERGENCY MEDICINE

## 2020-09-23 PROCEDURE — U0003 INFECTIOUS AGENT DETECTION BY NUCLEIC ACID (DNA OR RNA); SEVERE ACUTE RESPIRATORY SYNDROME CORONAVIRUS 2 (SARS-COV-2) (CORONAVIRUS DISEASE [COVID-19]), AMPLIFIED PROBE TECHNIQUE, MAKING USE OF HIGH THROUGHPUT TECHNOLOGIES AS DESCRIBED BY CMS-2020-01-R: HCPCS | Performed by: EMERGENCY MEDICINE

## 2020-09-23 RX ORDER — AMOXICILLIN 250 MG
1 CAPSULE ORAL 2 TIMES DAILY PRN
Status: DISCONTINUED | OUTPATIENT
Start: 2020-09-23 | End: 2020-09-26 | Stop reason: HOSPADM

## 2020-09-23 RX ORDER — NALOXONE HYDROCHLORIDE 0.4 MG/ML
.1-.4 INJECTION, SOLUTION INTRAMUSCULAR; INTRAVENOUS; SUBCUTANEOUS
Status: DISCONTINUED | OUTPATIENT
Start: 2020-09-23 | End: 2020-09-26 | Stop reason: HOSPADM

## 2020-09-23 RX ORDER — ACETAMINOPHEN 650 MG/1
650 SUPPOSITORY RECTAL EVERY 4 HOURS PRN
Status: DISCONTINUED | OUTPATIENT
Start: 2020-09-23 | End: 2020-09-24

## 2020-09-23 RX ORDER — SODIUM CHLORIDE, SODIUM LACTATE, POTASSIUM CHLORIDE, CALCIUM CHLORIDE 600; 310; 30; 20 MG/100ML; MG/100ML; MG/100ML; MG/100ML
INJECTION, SOLUTION INTRAVENOUS CONTINUOUS
Status: DISCONTINUED | OUTPATIENT
Start: 2020-09-23 | End: 2020-09-26 | Stop reason: HOSPADM

## 2020-09-23 RX ORDER — ACETAMINOPHEN 325 MG/1
650 TABLET ORAL EVERY 4 HOURS PRN
Status: DISCONTINUED | OUTPATIENT
Start: 2020-09-23 | End: 2020-09-24

## 2020-09-23 RX ORDER — BISACODYL 10 MG
10 SUPPOSITORY, RECTAL RECTAL DAILY PRN
Status: DISCONTINUED | OUTPATIENT
Start: 2020-09-23 | End: 2020-09-26 | Stop reason: HOSPADM

## 2020-09-23 RX ORDER — POLYETHYLENE GLYCOL 3350 17 G/17G
17 POWDER, FOR SOLUTION ORAL DAILY PRN
Status: DISCONTINUED | OUTPATIENT
Start: 2020-09-23 | End: 2020-09-26 | Stop reason: HOSPADM

## 2020-09-23 RX ORDER — IOPAMIDOL 755 MG/ML
120 INJECTION, SOLUTION INTRAVASCULAR ONCE
Status: COMPLETED | OUTPATIENT
Start: 2020-09-23 | End: 2020-09-23

## 2020-09-23 RX ORDER — ONDANSETRON 2 MG/ML
4 INJECTION INTRAMUSCULAR; INTRAVENOUS EVERY 6 HOURS PRN
Status: DISCONTINUED | OUTPATIENT
Start: 2020-09-23 | End: 2020-09-26 | Stop reason: HOSPADM

## 2020-09-23 RX ORDER — AMOXICILLIN 250 MG
2 CAPSULE ORAL 2 TIMES DAILY PRN
Status: DISCONTINUED | OUTPATIENT
Start: 2020-09-23 | End: 2020-09-26 | Stop reason: HOSPADM

## 2020-09-23 RX ORDER — ONDANSETRON 4 MG/1
4 TABLET, ORALLY DISINTEGRATING ORAL EVERY 6 HOURS PRN
Status: DISCONTINUED | OUTPATIENT
Start: 2020-09-23 | End: 2020-09-26 | Stop reason: HOSPADM

## 2020-09-23 RX ADMIN — CLOSTRIDIUM TETANI TOXOID ANTIGEN (FORMALDEHYDE INACTIVATED), CORYNEBACTERIUM DIPHTHERIAE TOXOID ANTIGEN (FORMALDEHYDE INACTIVATED), BORDETELLA PERTUSSIS TOXOID ANTIGEN (GLUTARALDEHYDE INACTIVATED), BORDETELLA PERTUSSIS FILAMENTOUS HEMAGGLUTININ ANTIGEN (FORMALDEHYDE INACTIVATED), BORDETELLA PERTUSSIS PERTACTIN ANTIGEN, AND BORDETELLA PERTUSSIS FIMBRIAE 2/3 ANTIGEN 0.5 ML: 5; 2; 2.5; 5; 3; 5 INJECTION, SUSPENSION INTRAMUSCULAR at 16:15

## 2020-09-23 RX ADMIN — IOPAMIDOL 120 ML: 755 INJECTION, SOLUTION INTRAVENOUS at 13:24

## 2020-09-23 RX ADMIN — SODIUM CHLORIDE, POTASSIUM CHLORIDE, SODIUM LACTATE AND CALCIUM CHLORIDE: 600; 310; 30; 20 INJECTION, SOLUTION INTRAVENOUS at 19:03

## 2020-09-23 RX ADMIN — SODIUM CHLORIDE 100 ML: 9 INJECTION, SOLUTION INTRAVENOUS at 13:24

## 2020-09-23 ASSESSMENT — ACTIVITIES OF DAILY LIVING (ADL)
FALL_HISTORY_WITHIN_LAST_SIX_MONTHS: YES
SWALLOWING: 0-->SWALLOWS FOODS/LIQUIDS WITHOUT DIFFICULTY
RETIRED_COMMUNICATION: 0-->UNDERSTANDS/COMMUNICATES WITHOUT DIFFICULTY
AMBULATION: 1-->ASSISTIVE EQUIPMENT
RETIRED_EATING: 0-->INDEPENDENT
BATHING: 1-->ASSISTIVE EQUIPMENT
TOILETING: 1-->ASSISTIVE EQUIPMENT
TRANSFERRING: 1-->ASSISTIVE EQUIPMENT
ADLS_ACUITY_SCORE: 22
DRESS: 1-->ASSISTIVE EQUIPMENT
COGNITION: 1 - ATTENTION OR MEMORY DEFICITS
WHICH_OF_THE_ABOVE_FUNCTIONAL_RISKS_HAD_A_RECENT_ONSET_OR_CHANGE?: FALL HISTORY
NUMBER_OF_TIMES_PATIENT_HAS_FALLEN_WITHIN_LAST_SIX_MONTHS: 1

## 2020-09-23 ASSESSMENT — ENCOUNTER SYMPTOMS
FACIAL ASYMMETRY: 1
APPETITE CHANGE: 0
CONFUSION: 1
WEAKNESS: 1

## 2020-09-23 NOTE — PLAN OF CARE
A&Ox4. CMS intact. Neuro-pt is forgetful, unable to lift right leg off bed. Bowel sounds audible, passing flatus, voiding frequently-intermittently incontinent of bladder, tolerating regular diet. Bedside swallow eval completed-no cough noted or issues swallowing. VSS. Maintained strict bedrest. Denies pain. Pt scoring green on the Aggression Stop Light Tool. Plan pending ortho consult.

## 2020-09-23 NOTE — H&P
[facility]    History and Physical - Hospitalist Service       Date of Admission:  9/23/2020    Assessment & Plan   Raulito Iyer is a 89 year old male admitted on 9/23/2020. He presents from his memory care facility after staff noted acute weakness.  Patient has a past medical history of dementia, TIA.    TIA  Patient had acute onset generalized weakness as noted by staff.  He also noticed acute left facial droop and left lower extremity weakness.  Upon admission to the ED, the patient actually had a right lower extremity weakness and facial droop no longer identified.  Code stroke initiated in the emergency department, neuroimaging shows patent arteries in the neck without evidence of dissection, patent proximal major intercranial arteries, unremarkable CT perfusion, and no evidence of acute intracranial hemorrhage, mass or herniation.  Labs are remarkable for leukocytosis of 19.3k, INR 1.23.  Infectious work-up: 2 view CXR negative for acute infiltrates. UA/UC unremarkable.  --Neurology following  --MRI per neuro recommendations  --will hold off on any ABx until clear indication   --PT/OT, care coordinator for return placement    Acute right femoral neck fracture  Upon road test in the emergency department, the patient was quite slow to move, particularly with his right lower extremity.  An abrasion was noted on the lateral aspect of the right knee.  Exam shows intact knee joint, without any instability.  There is no pain with active or passive ROM.  XR pelvis shows suboptimal evaluation of right femoral neck, additional imaging recommended by radiology.  CT pelvis obtained showing acute right femoral neck fracture.  --tylenol, ultram for unrelieved pain  --ortho consult   --ice    Dementia  Patient is confused at baseline.  During conversation he is appropriate and answer simple questions appropriate.  Significant short-term memory loss, is frequently repetitive staff.  Daughter believes him  to be at his baseline mental status currently.  --Monitor  --At high risk for delirium, please employ all delirium protocol measures       Diet:Regular  DVT Prophylaxis: Pneumatic Compression Devices  Farnsworth Catheter: not present  Code Status: DNR/I -discussed with daughter.  Currently, she is amenable to the current plan (including MRI).  She prefers a more conservative approach, and understands she can request a more conservative approach at any time.         Disposition Plan   Expected discharge: Tomorrow, recommended to prior living arrangement once mental status at baseline and safe disposition plan/ TCU bed available.  Entered: GEOFFREY Oliver CNP 09/23/2020, 2:43 PM     The patient's care was discussed with the Attending Physician, Dr. Quirino Collado, Bedside Nurse, Patient and Patient's Family.    GEOFFREY Oliver [facility]    ______________________________________________________________________    Chief Complaint   Confusion, unilateral weakness    History is obtained from the patient and daughter     History of Present Illness   Raulito Iyer is a 89 year old male who presents from his memory care facility with worsening confusion, weakness with ambulation and reported left facial droop.  Patient has a past medical history of TIA, dementia.    Typical functional status is patient moves without assistance or equipment and this a.m. he was unable to get out of his recliner without assistance from staff.  When the patient was upright, the patient noted slight left facial drooping, and dragging of his left leg.  Oddly enough, the patient arrived in the emergency department, his symptoms were grossly resolved per his daughter who met him there.  Stat neuroimaging does not show any acute abnormalities.  Upon a road test in the emergency department, the patient was unable to ambulate without significant help, and appeared to have right lower extremity pain and weakness.  Initial  x-ray was suboptimal.  Further imaging was obtained showing acute right femoral neck fracture.    I evaluated the patient with his daughter at the bedside in the emergency department.  He is semi-upright, appears to be in no distress.  He denies any acute chest pain, fever/chills, dyspnea, nausea/vomiting, recent diarrhea diarrhea or constipation.  He denies any urinary issues, although the staff tells me he has difficulty emptying his bladder and has had to urinate 4 times in the past hour.  He denies any upper or lower extremity pain with passive or active range of motion.    Review of Systems    The 10 point Review of Systems is negative other than noted in the HPI or here.     Past Medical History    I have reviewed this patient's medical history and updated it with pertinent information if needed.   Past Medical History:   Diagnosis Date     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 behavioral disturbance (H) 4/5/2017       Past Surgical History   I have reviewed this patient's surgical history and updated it with pertinent information if needed.  Past Surgical History:   Procedure Laterality Date     NO HISTORY OF SURGERY         Social History   I have reviewed this patient's social history and updated it with pertinent information if needed.  Social History     Tobacco Use     Smoking status: Former Smoker     Smokeless tobacco: Never Used   Substance Use Topics     Alcohol use: Yes     Alcohol/week: 3.0 - 6.0 standard drinks     Types: 3 - 6 Standard drinks or equivalent per week     Drug use: No       Family History   Unable to obtain due to: dementia    Prior to Admission Medications   Prior to Admission Medications   Prescriptions Last Dose Informant Patient Reported? Taking?   NO ACTIVE MEDICATIONS  Nursing Home Yes No   Sig:        Facility-Administered Medications: None     Allergies   No Known Allergies    Physical Exam   Vital Signs: Temp: 97.6  F (36.4  C) Temp src: Oral BP: (!) 143/69 Pulse: 90   Resp: 17 SpO2:  94 % O2 Device: Nasal cannula Oxygen Delivery: 2 LPM  Weight: 181 lbs 7.02 oz    Constitutional: awake, alert, cooperative, no apparent distress, and appears stated age  Respiratory: No increased work of breathing, good air exchange, clear to auscultation bilaterally, no crackles or wheezing  Cardiovascular: regular rate and rhythm, normal S1 and S2, no murmur noted and no edema  GI: soft, non tender, non distended  Skin: warm and dry.  Abrasion on lateral aspect of right knee  Musculoskeletal: There is no redness, warmth, or swelling of the joints.  Passive and active range of motion of bilateral knees intact.  Motor strength is 5 out of 5 all extremities bilaterally.   Neurologic: Awake, alert, oriented to name.  He is confused which is baseline.  Cranial nerves II-XII are grossly intact.  Motor is 5 out of 5 bilaterally.  Cerebellar finger to nose intact.  Gait unable to be tested  Neuropsychiatric: General: normal, calm and normal eye contact  Level of consciousness: alert / normal  Affect: normal and pleasant     Data   Data reviewed today: I reviewed all medications, new labs and imaging results over the last 24 hours. I personally reviewed the chest x-ray image(s) showing No acute infiltrates and the head CT image(s) showing No acute abnormalities.    Recent Labs   Lab 09/23/20  1308   WBC 19.3*   HGB 16.6   MCV 95      INR 1.23*      POTASSIUM 3.8   CHLORIDE 109   CO2 24   BUN 18   CR 0.85   ANIONGAP 9   SHERRIE 9.0   *     Recent Results (from the past 24 hour(s))   CT Head w/o Contrast    Narrative    CT SCAN OF THE HEAD WITHOUT CONTRAST   9/23/2020 1:20 PM     HISTORY: Code Stroke.    TECHNIQUE:  Axial images of the head and coronal reformations without  IV contrast material. Radiation dose for this scan was reduced using  automated exposure control, adjustment of the mA and/or kV according  to patient size, or iterative reconstruction technique.    COMPARISON: Brain MR  7/24/2017.    FINDINGS: No evidence of acute intracranial hemorrhage. No mass effect  or midline shift. No abnormal extra-axial fluid collection. Moderate  diffuse parenchymal volume loss. Nonspecific white matter changes  likely due to chronic microvascular ischemic disease. Ventricular size  is within normal limits without evidence of hydrocephalus.    The visualized portions of the sinuses and mastoids appear normal. The  bony calvarium and bones of the skull base appear intact.       Impression    IMPRESSION:     1. No evidence of acute intracranial hemorrhage, mass, or herniation.  2. Diffuse parenchymal volume loss and white matter changes likely due  to chronic microvascular ischemic disease.    JESSICA AGUDELO MD   CTA Head Neck with Contrast    Narrative    CT ANGIOGRAM OF THE HEAD AND NECK WITH CONTRAST  CT HEAD PERFUSION WITH CONTRAST September 23, 2020 1:24 PM     HISTORY: Code Stroke.    TECHNIQUE: CT angiography with an injection of 70mL Isovue-370  (accession [id number]), 50mL Isovue-370 (accession [id number]) IV with  scans through the head and neck. Images were transferred to a separate  3-D workstation where multiplanar reformations and 3-D images were  created. Estimates of carotid stenoses are made relative to the distal  internal carotid artery diameters except as noted. Radiation dose for  this scan was reduced using automated exposure control, adjustment of  the mA and/or kV according to patient size, or iterative  reconstruction technique.     Perfusion scans were performed with injection of additional IV  contrast. These images were processed on a separate 3-D workstation.     COMPARISON: [facility] 7/24/2017.     CT HEAD FINDINGS: No contrast enhancing lesions. CT perfusion images  of the head are unremarkable.     CT ANGIOGRAM HEAD FINDINGS: The major intracranial arteries including  the proximal branches of the anterior cerebral, middle cerebral, and  posterior cerebral arteries appear patent  without vascular cutoff. No  aneurysm identified. Multifocal stenoses of the intracranial vessels  including moderate stenoses of the left P1 and P2 segments, moderate  stenosis of the mid right P2 segment, and multiple additional mild  intracranial stenoses. Venous circulation is unremarkable.     CT ANGIOGRAM NECK FINDINGS: Scattered atherosclerotic calcification in  the aortic arch without significant stenosis at the origins of the  great vessels.     Right carotid artery: The right common and internal carotid arteries  are patent. Mild atherosclerotic disease at the carotid bifurcation  and proximal internal carotid artery without significant stenosis by  NASCET criteria.     Left carotid artery: The left common and internal carotid arteries are  patent. Mild atherosclerotic disease at the carotid bifurcation and  proximal internal carotid artery without significant stenosis by  NASCET criteria.     Vertebral arteries: Vertebral arteries are patent without evidence of  dissection. No significant stenosis.     Other findings: Marked multilevel degenerative changes throughout the  spine.       Impression    IMPRESSION:   1. Patent arteries in the neck without evidence of dissection. Mild  atherosclerotic disease in the carotid arteries bilaterally without  significant stenosis by NASCET criteria.  2. Patent proximal major intracranial arteries without vascular  cutoff. Multifocal mild to moderate stenoses of the intracranial  vessels likely due to intracranial atherosclerotic disease. No  aneurysm identified.  3. Unremarkable CT perfusion images of the head.     Results discussed with Michaelle Jarvis at 1:36 PM on 9/23/2020.     JESSICA AGUDELO MD   CT Head Perfusion w Contrast    Narrative    CT ANGIOGRAM OF THE HEAD AND NECK WITH CONTRAST  CT HEAD PERFUSION WITH CONTRAST September 23, 2020 1:24 PM     HISTORY: Code Stroke.    TECHNIQUE: CT angiography with an injection of 70mL Isovue-370  (accession [id number]), 50mL  Isovue-370 (accession [id number]) IV with  scans through the head and neck. Images were transferred to a separate  3-D workstation where multiplanar reformations and 3-D images were  created. Estimates of carotid stenoses are made relative to the distal  internal carotid artery diameters except as noted. Radiation dose for  this scan was reduced using automated exposure control, adjustment of  the mA and/or kV according to patient size, or iterative  reconstruction technique.     Perfusion scans were performed with injection of additional IV  contrast. These images were processed on a separate 3-D workstation.     COMPARISON: [facility] 7/24/2017.     CT HEAD FINDINGS: No contrast enhancing lesions. CT perfusion images  of the head are unremarkable.     CT ANGIOGRAM HEAD FINDINGS: The major intracranial arteries including  the proximal branches of the anterior cerebral, middle cerebral, and  posterior cerebral arteries appear patent without vascular cutoff. No  aneurysm identified. Multifocal stenoses of the intracranial vessels  including moderate stenoses of the left P1 and P2 segments, moderate  stenosis of the mid right P2 segment, and multiple additional mild  intracranial stenoses. Venous circulation is unremarkable.     CT ANGIOGRAM NECK FINDINGS: Scattered atherosclerotic calcification in  the aortic arch without significant stenosis at the origins of the  great vessels.     Right carotid artery: The right common and internal carotid arteries  are patent. Mild atherosclerotic disease at the carotid bifurcation  and proximal internal carotid artery without significant stenosis by  NASCET criteria.     Left carotid artery: The left common and internal carotid arteries are  patent. Mild atherosclerotic disease at the carotid bifurcation and  proximal internal carotid artery without significant stenosis by  NASCET criteria.     Vertebral arteries: Vertebral arteries are patent without evidence of  dissection. No  significant stenosis.     Other findings: Marked multilevel degenerative changes throughout the  spine.       Impression    IMPRESSION:   1. Patent arteries in the neck without evidence of dissection. Mild  atherosclerotic disease in the carotid arteries bilaterally without  significant stenosis by NASCET criteria.  2. Patent proximal major intracranial arteries without vascular  cutoff. Multifocal mild to moderate stenoses of the intracranial  vessels likely due to intracranial atherosclerotic disease. No  aneurysm identified.  3. Unremarkable CT perfusion images of the head.     Results discussed with Michaelle Jarvis at 1:36 PM on 9/23/2020.     JESSICA AGUDELO MD   XR Chest 2 Views    Narrative    CHEST TWO VIEWS 9/23/2020 2:25 PM     HISTORY: Weakness.    COMPARISON: August 27, 2012       Impression    IMPRESSION:  There are no acute infiltrates. The cardiac silhouette is  not enlarged. Pulmonary vasculature is unremarkable.     FRANCISCA SANTOS MD   XR Pelvis 1/2 Views    Narrative    PELVIS ONE TO TWO VIEWS  9/23/2020 2:26 PM     HISTORY:  Right leg weakness, ? groin pain.    FINDINGS: Lower lumbar spine degenerative change. Urinary tract  contrast. There is impression on the inferior aspect of the bladder,  presumably from an enlarged prostate.      Impression    IMPRESSION: Due to projection, the right femoral neck is suboptimally  evaluated. If there is clinical concern for fracture, I would  recommend additional views (internal rotation and/or frog-leg). There  is mild right hip joint space narrowing.   CT Pelvis Bone wo Contrast    Narrative    CT PELVIS BONE WITHOUT CONTRAST9/23/2020 3:08 PM     HISTORY: Pain with ambulating.    TECHNIQUE: Axial images with reconstructions. No IV contrast.  Radiation dose for this scan was reduced using automated exposure  control, adjustment of the mA and/or kV according to patient size, or  iterative reconstruction technique.    COMPARISON: None    FINDINGS: Fracture of the  subcapital portion of the right femoral  neck, with minimal displacement and impaction. Lower lumbar spine  degenerative change. Chondrocalcinosis of the hips and symphysis  pubis, consistent with calcium pyrophosphate deposition disease.  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 Urinary tract  contrast. Prostate enlargement with impression on the bladder.  Osteopenia suspected. Bridging hyperostosis at the anterior aspects of  both sacroiliac joints.       Impression    IMPRESSION:  1. Right femoral neck fracture.  2. Additional findings discussed above.

## 2020-09-23 NOTE — ED PROVIDER NOTES
History     Chief Complaint:  Weakness     The history is provided by the patient.      Raulito Iyer is an 89 year old male with a history of dementia and TIA who presents via EMS from his memory care facility after being noted to have weakness. Raulito is normally able to ambulate without assistance and takes no medications. Per EMS his facility noticed he had difficulty getting out of a recliner around an hour and a half prior to arrival. He was able to get himself out of bed earlier today and staff noted no issues two hours prior to arrival.     Raulito is feeling well and denies complaints.    Per staff at his care facility, Raulito had slight left sided facial drooping, was dragging his left leg, and seemed more confused compared to baseline.     Allergies:  No Known Drug Allergies     Medications:    The patient is not currently taking any prescribed medications.    Past Medical History:    Dementia  TIA    Past Surgical History:    History reviewed.  No pertinent past surgical history.    Family History:   History reviewed. No pertinent family history.     Social History:  The patient presents to the ED alone via EMS. Daughter later at bedside.  Smoking Status: Former Smoker  Smokeless Tobacco: Never Used  Alcohol Use: Yes  Drug Use: No  PCP: Sunny Yun     Review of Systems   Unable to perform ROS: Dementia   Constitutional: Negative for appetite change.   Musculoskeletal: Positive for gait problem.   Neurological: Positive for facial asymmetry (left sided) and weakness.   Psychiatric/Behavioral: Positive for confusion (more than baseline).     Physical Exam     Patient Vitals for the past 24 hrs:   BP Temp Temp src Pulse Resp SpO2 Weight   09/23/20 1348 -- -- -- 90 17 94 % --   09/23/20 1342 -- -- -- 84 12 93 % --   09/23/20 1332 (!) 143/69 -- -- 82 21 -- --   09/23/20 1315 121/69 -- -- 86 16 92 % --   09/23/20 1259 (!) 141/78 97.6  F (36.4  C) Oral 102 16 93 % 82.3 kg (181 lb 7 oz)       Physical  Exam  General: Well-developed and well-nourished. Well appearing elderly  man. Cooperative.  Head:  Atraumatic.  Eyes:  Conjunctivae, lids, and sclerae are normal.  Neck:  Supple. Normal range of motion.  CV:  Regular rate and rhythm. Normal heart sounds with no murmurs, rubs, or gallops detected.  Resp:  No respiratory distress. Clear to auscultation bilaterally without decreased breath sounds, wheezing, rales, or rhonchi.  GI:  Soft. Non-distended. Non-tender.    MS:  Normal ROM.  No focal tenderness to palpation throughout the hips, knees, or ankles bilaterally.  No bilateral lower extremity edema.  Skin:  Warm. Non-diaphoretic. No pallor.  Abrasion over the lateral right knee.  Neuro: Awake. A&Ox3.     Strength 5/5 bilateral upper extremities.    5 out of 5 strength in the left lower extremity.  Unable to lift right lower extremity off of bed.    No pronator drift.    Sensation intact to light touch.    No facial droop. No dysarthria.    No aphasia.    PERRL.   Psych: Normal mood and affect. Normal speech.  Vitals reviewed.    Emergency Department Course     ECG:  Indication: Weakness; Confusion  Time: 1301  Rate 95 bpm. MD interval 202. QRS duration 152. QT/QTc 390/490.   Sinus rhythm with premature supraventricular complexes. Left axis deviation. Left bundle branch block. Abnormal ECG.    No acute ST changes.  No significant change as compared to prior, dated 7/24/17.     Imaging:  Radiology findings were communicated with the patient, family and admitting MD who voiced understanding of the findings.    CT Head Perfusion w Contrast:  1. Patent arteries in the neck without evidence of dissection. Mild atherosclerotic disease in the carotid arteries bilaterally without significant stenosis by NASCET criteria.  2. Patent proximal major intracranial arteries without vascular cutoff. Multifocal mild to moderate stenoses of the intracranial vessels likely due to intracranial atherosclerotic disease. No  aneurysm identified.  3. Unremarkable CT perfusion images of the head.   As per radiology.    CTA Head Neck with Contrast:  1. Patent arteries in the neck without evidence of dissection. Mild atherosclerotic disease in the carotid arteries bilaterally without significant stenosis by NASCET criteria.  2. Patent proximal major intracranial arteries without vascular cutoff. Multifocal mild to moderate stenoses of the intracranial vessels likely due to intracranial atherosclerotic disease. No aneurysm identified.  3. Unremarkable CT perfusion images of the head.   As per radiology.     CT Head w/o Contrast:  1. No evidence of acute intracranial hemorrhage, mass, or herniation.  2. Diffuse parenchymal volume loss and white matter changes likely due to chronic microvascular ischemic disease.  As per radiology.     XR Chest 2 views:   There are no acute infiltrates. The cardiac silhouette is not enlarged. Pulmonary vasculature is unremarkable.   As per radiology.    XR Pelvis 1/2 Views:  Due to projection, the right femoral neck is suboptimally evaluated. If there is clinical concern for fracture, I would recommend additional views (internal rotation and/or frog-leg). There is mild right hip joint space narrowing.  As per radiology.     CT Pelvis Bone w/o Contrast:  1. Right femoral neck fracture.  2. Additional findings discussed above.   As per radiology.     MR Brain w/o & w Contrast:  Pending    Laboratory:  Laboratory findings were communicated with the patient, family and admitting MD who voiced understanding of the findings.    CBC: WBC: 19.3 (high), HGB: 16.6, PLT: 294    BMP: Glucose 174 (high), o/w WNL (Creatinine: 0.85)    PTT: 33    INR: 1.23 (high)    UA with Microscopic: Ketones 40, Blood Moderate, Albumin 30, Leukocyte Esterace Moderate, Squamous Epithelial 2, Mucous Present o/w Negative      Asymptomatic COVID-19 Virus (Coronavirus) PCR: Pending     Interventions:  1615 Tdap 0.5mL IM    Emergency Department  Course:  Past medical records, nursing notes, and vitals reviewed.    1255 I performed an exam of the patient as documented above.     1302 I called the patient's facility and obtained additional information as noted above.     1305 Code stroke called.    1308 I spoke with BOO Hernandez, of stroke neurology regarding the patient's presentation here.     EKG obtained in the ED, see results above.     IV was inserted and blood was drawn for laboratory testing, results above.    The patient provided a urine sample here in the emergency department. This was sent for laboratory testing, findings above.    The patient was sent for multiple radiographs while in the emergency department, results above.     1336 I spoke with Dr. Schneider, radiologist reading the patient's images.     1344 I rechecked the patient.    1355 I spoke with Mary of stroke neurology here in the emergency department who had spoken more with the patient's memory facility and the patient's daughter. His daughter feels that the patient is at baseline currently. At this time we opted to deescalate the code stroke.     1430 I rechecked the patient.     1440 I consulted with Dr. Collado, hospitalist, regarding the patient's history and presentation here in the emergency department who accepted the patient for admission. After discussion with Dr. Collado we opted for CT of the patient's pelvis to visualize the right hip.     1530 I rechecked the patient and updated him on the results of his CT.     1550 I consulted with Flor Ordonez, orthopedics PA, regarding the patient's history and presentation here in the emergency department.     1555 I rechecked the patient and updated him regarding my conversation with orthopedics. Tetanus will be updated here.     Findings and plan explained to the patient and his daughter who consent to admission. Discussed the patient with Dr. Collado who will admit the patient to a medical bed for further monitoring, evaluation,  and treatment.    I personally reviewed the laboratory and imaging results with the patient's daughter and answered all related questions prior to admission.     Impression & Plan     Covid-19  Raulito Iyer was evaluated during a global COVID-19 pandemic, which necessitated consideration that the patient might be at risk for infection with the SARS-CoV-2 virus that causes COVID-19.   Applicable protocols for evaluation were followed during the patient's care. COVID-19 was considered as part of the patient's evaluation. The plan for testing is: a test was obtained during this visit.    CMS Diagnoses: The patient has stroke symptoms:         ED Stroke specific documentation           NIHSS PDF     Patient last known well time: 1130  ED Provider first to bedside at: 1255  CT Results received at: 1336    tPA:   Not given due to minor/isolated/quickly resolving symptoms.    If treating with tPA: Ensure SBP<185 and DBP<105 prior to treatment with IV tPA.  Administering IV tPA after treatment with IV labetalol, hydralazine, or nicardipine is reasonable once BP control is established.    Endovascular Retrieval:  Not initiated due to absence of proximal vessel occlusion    National Institutes of Health Stroke Scale (Baseline)  Time Performed: 1336     Score    Level of consciousness: (0)   Alert, keenly responsive    LOC questions: (0)   Answers both questions correctly    LOC commands: (0)   Performs both tasks correctly    Best gaze: (0)   Normal    Visual: (0)   No visual loss    Facial palsy: (0)   Normal symmetrical movements    Motor arm (left): (0)   No drift    Motor arm (right): (0)   No drift    Motor leg (left): (0)   No drift    Motor leg (right): (2)   Some effort against gravity    Limb ataxia: (0)   Absent    Sensory: (0)   Normal- no sensory loss    Best language: (0)   Normal- no aphasia    Dysarthria: (0)   Normal    Extinction and inattention: (0)   No abnormality        Total Score:  2      Medical  Decision Making:  Raulito is a an 89-year-old man who, per his facility, about 1-1/2 hours prior to arrival acutely had left-sided weakness with facial droop and seemed more confused than his usual dementia.  Raulito himself is feeling well and denies any issues.  On my exam he has no left-sided weakness including no facial droop.  However, he is unable to raise his right leg off the bed.  I was concerned this may represent weakness so I called a code stroke.  Fortunately, CT head reveals no intracranial hemorrhage or mass.  CT angiogram of the head and neck and CT perfusion studies reveal mild to moderate atherosclerotic disease only.  Stroke neurology was able to evaluate the patient and feel this may be a TIA based on the symptoms presented from his care facility.  However, he certainly is not a TPA candidate given resolution of symptoms.  They recommend admission for MRI and further work-up.  EKG is reassuring without acute ST changes or arrhythmias troponin is undetectable.  He does have a leukocytosis to 19.3 although chest x-ray does not reveal pneumonia and urinalysis is without UTI.  This is of uncertain etiology as remainder of his labs revealed no anemia, kidney injury, or electrolyte derangements.  Hyperglycemia is appreciated and he has not a known diabetic.  On exam, he does have an abrasion of his right knee and he continues to have difficulty raising this extremity off of the cart.  X-ray was suboptimal but did not reveal fracture.  I attempted to ambulate him in the emergency department he was limping although he would not localize an area of pain.  I discussed the patient's case with Dr. Castillo, hospitalist, who accepts admission but requests CT scan the pelvis to look for fracture.  Unfortunately, this does reveal a right femoral neck fracture.  I discussed the patient's case with Flor Ordonez, orthopedics PA, who has no further orders at this time.  I updated the patient and his daughter several  times throughout his emergency department stay on findings and plan.  I updated his tetanus and he required no further interventions.  He was transferred to the floor under Dr. Collado with no further issues.     Diagnosis:    ICD-10-CM    1. TIA (transient ischemic attack)  G45.9    2. Closed fracture of neck of right femur, initial encounter (H)  S72.001A    3. Abrasion of right knee, initial encounter  S80.211A    4. Leukocytosis, unspecified type  D72.829      Disposition:  Admitted to Dr. Collado.    Scribe Disclosure:  I, Shawn Galina, am serving as a scribe at 1:41 PM on 9/23/2020 to document services personally performed by Michaelle Jarvis MD based on my observations and the provider's statements to me.      Michaelle Jarvis MD  09/23/20 1958

## 2020-09-23 NOTE — ED NOTES
[facility]  ED Nurse Handoff Report    ED Chief complaint: Generalized Weakness (Per staff at pt's memory care unit the pt had trouble standing at 1100 today.  )      ED Diagnosis:   Final diagnoses:   None       Code Status: tbd    Allergies: No Known Allergies    Patient Story: Raulito comes to the ER today with weakness in his legs when he was standing up today. CT's are all negative for stroke. Await ua results. VSS, await MRI. Pt has dementia and does get up out of bed quickly. His pelvic CT does show a left hip fx, with his dementia it has been difficult to assess where his pain is. No one witnessed a fall at his nursing home.      Treatments and/or interventions provided: mri/ct  Patient's response to treatments and/or interventions: n/a    To be done/followed up on inpatient unit:  neuro consult    Does this patient have any cognitive concerns?: dementia  Activity level - Baseline/Home:  Stand with Assist  Activity Level - Current:   Stand with Assist    Patient's Preferred language: English   Needed?: No    Isolation: None  Infection: Not Applicable  Bariatric?: No    Vital Signs:   Vitals:    09/23/20 1315 09/23/20 1332 09/23/20 1342 09/23/20 1348   BP: 121/69 (!) 143/69     Pulse: 86 82 84 90   Resp: 16 21 12 17   Temp:       TempSrc:       SpO2: 92%  93% 94%   Weight:           Cardiac Rhythm:Cardiac Rhythm: Normal sinus rhythm    Was the PSS-3 completed:   No -dementia  What interventions are required if any?               Family Comments: daughter at bedide  OBS brochure/video discussed/provided to patient/family: N/A              Name of person given brochure if not patient: na              Relationship to patient: na    For the majority of the shift this patient's behavior was Green.   Behavioral interventions performed were na.    ED NURSE PHONE NUMBER: [phone]

## 2020-09-23 NOTE — ED NOTES
Bed: ST03  Expected date:   Expected time:   Means of arrival:   Comments:  HE - 89 M weakness eta 1250

## 2020-09-23 NOTE — PHARMACY-ADMISSION MEDICATION HISTORY
Pharmacy Medication History  Admission medication history interview status for the 9/23/2020  admission is complete. See EPIC admission navigator for prior to admission medications     Medication history sources: Patient's family/friend (Daughter Maria Elena Castro)  Medication history source reliability: Good  Adherence assessment: Good    Medication reconciliation completed by provider prior to medication history? No    Time spent in this activity: 5 min      Prior to Admission medications    Medication Sig Last Dose Taking? Auth Provider   NO ACTIVE MEDICATIONS     Reported, Patient       Gifty Jarvis, SekouD, BCPS

## 2020-09-23 NOTE — ED NOTES
Bladder scanned pt and he had 390 ml's in his bladder, he is able to void, he voided 200cc's, concern for pt to pull on jackson cath if placed, md aware

## 2020-09-23 NOTE — PROGRESS NOTES
RECEIVING UNIT ED HANDOFF REVIEW    ED Nurse Handoff Report was reviewed by: Janina Jones RN on September 23, 2020 at 4:36 PM

## 2020-09-23 NOTE — CONSULTS
"  [facility]    Stroke Consult Note    Reason for Consult: Stroke Code    Chief Complaint: Generalized Weakness (Per staff at pt's memory care unit the pt had trouble standing at 1100 today.  )      KATHERYN Iyer is a 89 year old male with past medical history significant for memory impairment (resides in memory care facility) who was brought to the ED for evaluation of increased confusion and difficulty ambulating. He was reportedly in a chair and last seen well at 1100. At 1130, staff attempted to help him out of the chair and noticed that he seemed weak, with possible left leg weakness and worsened confusion from baseline. There are no reports of a fall. On exam in the ED, he did not have any signs of left body weakness, but was unable to elevate his RLE and complained of right hip pain. He also had an abrasion to his right shin. His daughter was present and reports that his memory appears at baseline.     CT/CTA/CTP were negative for acute pathology. CT pelvis shows a right femoral neck fracture.     TPA Treatment   Not given due to minor/isolated/quickly resolving symptoms.    Endovascular Treatment  Not initiated due to absence of proximal vessel occlusion    Impression  Unclear history of possible left sided weakness with right leg weakness on exam. Suspect that difficulty ambulating was secondary to right femoral neck fracture rather than TIA vs stroke.     Recommendations  - MRI brain if new suspicion for left sided deficits, otherwise would not pursue further TIA/stroke evaluation at this time    Thank you for this consult. No further stroke evaluation is recommended, so we will sign off. Please contact us with any additional questions.    GEOFFREY Kenney, CNP  Neurology  To page me or covering stroke neurology team member, click here: AMCOM   Choose \"On Call\" tab at top, then search dropdown box for \"Neurology Adult\", select location, press Enter, then look for stroke/neuro " ICU/telestroke.    ______________________________________________________    Past Medical History   Past Medical History:   Diagnosis Date     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 behavioral disturbance (H) 4/5/2017     Past Surgical History   Past Surgical History:   Procedure Laterality Date     NO HISTORY OF SURGERY       Medications   Home Meds  Prior to Admission medications    Medication Sig Start Date End Date Taking? Authorizing Provider   NO ACTIVE MEDICATIONS      Reported, Patient       Scheduled Meds      Infusion Meds      PRN Meds      Allergies   No Known Allergies  Family History   History reviewed. No pertinent family history.  Social History   Social History     Tobacco Use     Smoking status: Former Smoker     Smokeless tobacco: Never Used   Substance Use Topics     Alcohol use: Yes     Alcohol/week: 3.0 - 6.0 standard drinks     Types: 3 - 6 Standard drinks or equivalent per week     Drug use: No       Review of Systems   The 10 point Review of Systems is negative other than noted in the HPI or here.        PHYSICAL EXAMINATION  Temp:  [97.6  F (36.4  C)] 97.6  F (36.4  C)  Pulse:  [] 90  Resp:  [12-21] 17  BP: (121-143)/(69-78) 143/69  SpO2:  [92 %-94 %] 94 %     Neurologic  Mental Status:  follows commands, speech clear and fluent, naming and repetition normal, alert, oriented to self but cannot name month or year (baseline per daughter)  Cranial Nerves:  PERRL, EOMI with normal smooth pursuit, facial movements symmetric, hearing not formally tested but intact to conversation, palate elevation symmetric and uvula midline, no dysarthria, tongue protrusion midline  Motor:  normal muscle tone and bulk, no abnormal movements, RLE with some effort but falls to bed, RU/LUE without drift, LLE without drift  Reflexes:  toes down-going  Sensory:  light touch sensation intact and symmetric throughout upper and lower extremities, no extinction on double simultaneous stimulation   Coordination:  normal  finger-to-nose and heel-to-shin bilaterally without dysmetria  Station/Gait:  deferred      Dysphagia Screen  Per Nursing    Stroke Scales    NIHSS  Interval     Interval Comments     1a. Level of Consciousness 0-->Alert, keenly responsive   1b. LOC Questions 2-->Answers neither question correctly   1c. LOC Commands 0-->Performs both tasks correctly   2.   Best Gaze 0-->Normal   3.   Visual 0-->No visual loss   4.   Facial Palsy 0-->Normal symmetrical movements   5a. Motor Arm, Left 0-->No drift, limb holds 90 (or 45) degrees for full 10 secs   5b. Motor Arm, Right 0-->No drift, limb holds 90 (or 45) degrees for full 10 secs   6a. Motor Leg, Left 0-->No drift, leg holds 30 degree position for full 5 secs   6b. Motor Leg, right 2-->Some effort against gravity, leg falls to bed by 5 secs, but has some effort against gravity   7.   Limb Ataxia 0-->Absent   8.   Sensory 0-->Normal, no sensory loss   9.   Best Language 0-->No aphasia, normal   10. Dysarthria 0-->Normal   11. Extinction and Inattention  0-->No abnormality   Total 4 (09/23/20 1656)       Imaging  I personally reviewed all imaging; relevant findings per HPI.     Lab Results Data   CBC  Recent Labs   Lab 09/23/20  1308   WBC 19.3*   RBC 5.24   HGB 16.6   HCT 49.9        Basic Metabolic Panel    Recent Labs   Lab 09/23/20  1308      POTASSIUM 3.8   CHLORIDE 109   CO2 24   BUN 18   CR 0.85   *   SHERRIE 9.0     Liver Panel  No results for input(s): PROTTOTAL, ALBUMIN, BILITOTAL, ALKPHOS, AST, ALT, BILIDIRECT in the last 168 hours.  INR    Recent Labs   Lab Test 09/23/20  1308 07/24/17  1605   INR 1.23* 0.98      Lipid Profile  No lab results found.  A1C  No lab results found.  Troponin I  No results for input(s): TROPI in the last 168 hours.       Stroke Code / Stroke Consult Data Data   Stroke Code Data  (for stroke code without tele)  Stroke code activated 09/23/20   1306   First stroke provider response 09/23/20   1308   Last known normal  09/23/20   1100   Time of discovery   (or onset of symptoms) 09/23/20   1130   Head CT read by me 09/23/20   1320   Was stroke code de-escalated? Yes 09/23/20 1404  symptoms not likely caused by stroke

## 2020-09-23 NOTE — PHARMACY-ADMISSION MEDICATION HISTORY
Pharmacy Medication History  Admission medication history interview status for the 9/23/2020  admission is complete. See EPIC admission navigator for prior to admission medications     Medication history sources: Daughter Maria Elena [phone], [facility] [phone]  Medication history source reliability: Good  Adherence assessment: Good    Additional medication history information:   Raulito takes no medications, OTC products or supplements.    Medication reconciliation completed by provider prior to medication history? No    Time spent in this activity: 5 minutes      Prior to Admission medications    Medication Sig Last Dose Taking? Auth Provider   NO ACTIVE MEDICATIONS     Reported, Patient

## 2020-09-24 ENCOUNTER — APPOINTMENT (OUTPATIENT)
Dept: GENERAL RADIOLOGY | Facility: CLINIC | Age: 85
DRG: 481 | End: 2020-09-24
Attending: HOSPITALIST
Payer: MEDICARE

## 2020-09-24 ENCOUNTER — APPOINTMENT (OUTPATIENT)
Dept: GENERAL RADIOLOGY | Facility: CLINIC | Age: 85
DRG: 481 | End: 2020-09-24
Attending: PHYSICIAN ASSISTANT
Payer: MEDICARE

## 2020-09-24 ENCOUNTER — ANESTHESIA (OUTPATIENT)
Dept: SURGERY | Facility: CLINIC | Age: 85
DRG: 481 | End: 2020-09-24
Payer: MEDICARE

## 2020-09-24 ENCOUNTER — ANESTHESIA EVENT (OUTPATIENT)
Dept: SURGERY | Facility: CLINIC | Age: 85
DRG: 481 | End: 2020-09-24
Payer: MEDICARE

## 2020-09-24 LAB
ANION GAP SERPL CALCULATED.3IONS-SCNC: 5 MMOL/L (ref 3–14)
BASOPHILS # BLD AUTO: 0 10E9/L (ref 0–0.2)
BASOPHILS NFR BLD AUTO: 0.3 %
BUN SERPL-MCNC: 17 MG/DL (ref 7–30)
CALCIUM SERPL-MCNC: 7.8 MG/DL (ref 8.5–10.1)
CHLORIDE SERPL-SCNC: 111 MMOL/L (ref 94–109)
CO2 SERPL-SCNC: 25 MMOL/L (ref 20–32)
CREAT SERPL-MCNC: 0.83 MG/DL (ref 0.66–1.25)
DIFFERENTIAL METHOD BLD: ABNORMAL
EOSINOPHIL # BLD AUTO: 0.3 10E9/L (ref 0–0.7)
EOSINOPHIL NFR BLD AUTO: 2.6 %
ERYTHROCYTE [DISTWIDTH] IN BLOOD BY AUTOMATED COUNT: 14.1 % (ref 10–15)
GFR SERPL CREATININE-BSD FRML MDRD: 78 ML/MIN/{1.73_M2}
GLUCOSE SERPL-MCNC: 96 MG/DL (ref 70–99)
HCT VFR BLD AUTO: 41 % (ref 40–53)
HGB BLD-MCNC: 13.6 G/DL (ref 13.3–17.7)
IMM GRANULOCYTES # BLD: 0.1 10E9/L (ref 0–0.4)
IMM GRANULOCYTES NFR BLD: 0.7 %
LYMPHOCYTES # BLD AUTO: 2.3 10E9/L (ref 0.8–5.3)
LYMPHOCYTES NFR BLD AUTO: 19.4 %
MCH RBC QN AUTO: 31.4 PG (ref 26.5–33)
MCHC RBC AUTO-ENTMCNC: 33.2 G/DL (ref 31.5–36.5)
MCV RBC AUTO: 95 FL (ref 78–100)
MONOCYTES # BLD AUTO: 1 10E9/L (ref 0–1.3)
MONOCYTES NFR BLD AUTO: 8.2 %
NEUTROPHILS # BLD AUTO: 8.3 10E9/L (ref 1.6–8.3)
NEUTROPHILS NFR BLD AUTO: 68.8 %
NRBC # BLD AUTO: 0 10*3/UL
NRBC BLD AUTO-RTO: 0 /100
PLATELET # BLD AUTO: 229 10E9/L (ref 150–450)
POTASSIUM SERPL-SCNC: 3.5 MMOL/L (ref 3.4–5.3)
RBC # BLD AUTO: 4.33 10E12/L (ref 4.4–5.9)
SODIUM SERPL-SCNC: 141 MMOL/L (ref 133–144)
WBC # BLD AUTO: 12.1 10E9/L (ref 4–11)

## 2020-09-24 PROCEDURE — 25800030 ZZH RX IP 258 OP 636: Performed by: ANESTHESIOLOGY

## 2020-09-24 PROCEDURE — 37000009 ZZH ANESTHESIA TECHNICAL FEE, EACH ADDTL 15 MIN: Performed by: ORTHOPAEDIC SURGERY

## 2020-09-24 PROCEDURE — 25000125 ZZHC RX 250: Performed by: ORTHOPAEDIC SURGERY

## 2020-09-24 PROCEDURE — 25000125 ZZHC RX 250: Performed by: NURSE ANESTHETIST, CERTIFIED REGISTERED

## 2020-09-24 PROCEDURE — 25000128 H RX IP 250 OP 636: Performed by: PHYSICIAN ASSISTANT

## 2020-09-24 PROCEDURE — 36415 COLL VENOUS BLD VENIPUNCTURE: CPT | Performed by: NURSE PRACTITIONER

## 2020-09-24 PROCEDURE — 40000170 ZZH STATISTIC PRE-PROCEDURE ASSESSMENT II: Performed by: ORTHOPAEDIC SURGERY

## 2020-09-24 PROCEDURE — C1713 ANCHOR/SCREW BN/BN,TIS/BN: HCPCS | Performed by: ORTHOPAEDIC SURGERY

## 2020-09-24 PROCEDURE — 37000008 ZZH ANESTHESIA TECHNICAL FEE, 1ST 30 MIN: Performed by: ORTHOPAEDIC SURGERY

## 2020-09-24 PROCEDURE — 25000128 H RX IP 250 OP 636: Performed by: NURSE ANESTHETIST, CERTIFIED REGISTERED

## 2020-09-24 PROCEDURE — 40000278 XR SURGERY CARM FLUORO LESS THAN 5 MIN

## 2020-09-24 PROCEDURE — 71000012 ZZH RECOVERY PHASE 1 LEVEL 1 FIRST HR: Performed by: ORTHOPAEDIC SURGERY

## 2020-09-24 PROCEDURE — 0QS634Z REPOSITION RIGHT UPPER FEMUR WITH INTERNAL FIXATION DEVICE, PERCUTANEOUS APPROACH: ICD-10-PCS | Performed by: ORTHOPAEDIC SURGERY

## 2020-09-24 PROCEDURE — 25000128 H RX IP 250 OP 636: Performed by: NURSE PRACTITIONER

## 2020-09-24 PROCEDURE — 25000128 H RX IP 250 OP 636: Performed by: ANESTHESIOLOGY

## 2020-09-24 PROCEDURE — 99232 SBSQ HOSP IP/OBS MODERATE 35: CPT | Performed by: HOSPITALIST

## 2020-09-24 PROCEDURE — 36000065 ZZH SURGERY LEVEL 4 W FLUORO 1ST 30 MIN: Performed by: ORTHOPAEDIC SURGERY

## 2020-09-24 PROCEDURE — 27210794 ZZH OR GENERAL SUPPLY STERILE: Performed by: ORTHOPAEDIC SURGERY

## 2020-09-24 PROCEDURE — 85025 COMPLETE CBC W/AUTO DIFF WBC: CPT | Performed by: NURSE PRACTITIONER

## 2020-09-24 PROCEDURE — 80048 BASIC METABOLIC PNL TOTAL CA: CPT | Performed by: NURSE PRACTITIONER

## 2020-09-24 PROCEDURE — 12000000 ZZH R&B MED SURG/OB

## 2020-09-24 PROCEDURE — 25000132 ZZH RX MED GY IP 250 OP 250 PS 637: Mod: GY | Performed by: PHYSICIAN ASSISTANT

## 2020-09-24 PROCEDURE — 40000985 XR PELVIS AND HIP RIGHT 1 VIEW

## 2020-09-24 PROCEDURE — 25800030 ZZH RX IP 258 OP 636: Performed by: NURSE PRACTITIONER

## 2020-09-24 PROCEDURE — 36000063 ZZH SURGERY LEVEL 4 EA 15 ADDTL MIN: Performed by: ORTHOPAEDIC SURGERY

## 2020-09-24 PROCEDURE — 25800030 ZZH RX IP 258 OP 636: Performed by: PHYSICIAN ASSISTANT

## 2020-09-24 PROCEDURE — 25800030 ZZH RX IP 258 OP 636: Performed by: NURSE ANESTHETIST, CERTIFIED REGISTERED

## 2020-09-24 DEVICE — IMPLANTABLE DEVICE: Type: IMPLANTABLE DEVICE | Site: HIP | Status: FUNCTIONAL

## 2020-09-24 DEVICE — IMP SCR SYN CAN 7.3X32 THRDX90MM SS 209.890: Type: IMPLANTABLE DEVICE | Site: HIP | Status: FUNCTIONAL

## 2020-09-24 RX ORDER — CEFAZOLIN SODIUM 2 G/100ML
2 INJECTION, SOLUTION INTRAVENOUS
Status: COMPLETED | OUTPATIENT
Start: 2020-09-24 | End: 2020-09-24

## 2020-09-24 RX ORDER — HYDROMORPHONE HYDROCHLORIDE 1 MG/ML
.3-.5 INJECTION, SOLUTION INTRAMUSCULAR; INTRAVENOUS; SUBCUTANEOUS
Status: DISCONTINUED | OUTPATIENT
Start: 2020-09-24 | End: 2020-09-26 | Stop reason: HOSPADM

## 2020-09-24 RX ORDER — PROPOFOL 10 MG/ML
INJECTION, EMULSION INTRAVENOUS CONTINUOUS PRN
Status: DISCONTINUED | OUTPATIENT
Start: 2020-09-24 | End: 2020-09-24

## 2020-09-24 RX ORDER — SODIUM CHLORIDE, SODIUM LACTATE, POTASSIUM CHLORIDE, CALCIUM CHLORIDE 600; 310; 30; 20 MG/100ML; MG/100ML; MG/100ML; MG/100ML
INJECTION, SOLUTION INTRAVENOUS CONTINUOUS
Status: DISCONTINUED | OUTPATIENT
Start: 2020-09-24 | End: 2020-09-25

## 2020-09-24 RX ORDER — FENTANYL CITRATE 50 UG/ML
25-50 INJECTION, SOLUTION INTRAMUSCULAR; INTRAVENOUS
Status: DISCONTINUED | OUTPATIENT
Start: 2020-09-24 | End: 2020-09-24 | Stop reason: HOSPADM

## 2020-09-24 RX ORDER — HYDROMORPHONE HYDROCHLORIDE 1 MG/ML
.3-.5 INJECTION, SOLUTION INTRAMUSCULAR; INTRAVENOUS; SUBCUTANEOUS EVERY 5 MIN PRN
Status: DISCONTINUED | OUTPATIENT
Start: 2020-09-24 | End: 2020-09-24 | Stop reason: HOSPADM

## 2020-09-24 RX ORDER — SODIUM CHLORIDE, SODIUM LACTATE, POTASSIUM CHLORIDE, CALCIUM CHLORIDE 600; 310; 30; 20 MG/100ML; MG/100ML; MG/100ML; MG/100ML
INJECTION, SOLUTION INTRAVENOUS CONTINUOUS
Status: DISCONTINUED | OUTPATIENT
Start: 2020-09-24 | End: 2020-09-24 | Stop reason: HOSPADM

## 2020-09-24 RX ORDER — CHLOROPROCAINE HYDROCHLORIDE 20 MG/ML
INJECTION, SOLUTION EPIDURAL; INFILTRATION; INTRACAUDAL; PERINEURAL PRN
Status: DISCONTINUED | OUTPATIENT
Start: 2020-09-24 | End: 2020-09-24

## 2020-09-24 RX ORDER — FENTANYL CITRATE 50 UG/ML
INJECTION, SOLUTION INTRAMUSCULAR; INTRAVENOUS PRN
Status: DISCONTINUED | OUTPATIENT
Start: 2020-09-24 | End: 2020-09-24

## 2020-09-24 RX ORDER — NALOXONE HYDROCHLORIDE 0.4 MG/ML
.1-.4 INJECTION, SOLUTION INTRAMUSCULAR; INTRAVENOUS; SUBCUTANEOUS
Status: ACTIVE | OUTPATIENT
Start: 2020-09-24 | End: 2020-09-25

## 2020-09-24 RX ORDER — CEFAZOLIN SODIUM 2 G/100ML
2 INJECTION, SOLUTION INTRAVENOUS EVERY 8 HOURS
Status: COMPLETED | OUTPATIENT
Start: 2020-09-25 | End: 2020-09-25

## 2020-09-24 RX ORDER — ACETAMINOPHEN 325 MG/1
650 TABLET ORAL EVERY 4 HOURS PRN
Status: DISCONTINUED | OUTPATIENT
Start: 2020-09-27 | End: 2020-09-26 | Stop reason: HOSPADM

## 2020-09-24 RX ORDER — ACETAMINOPHEN 325 MG/1
975 TABLET ORAL EVERY 8 HOURS
Status: DISCONTINUED | OUTPATIENT
Start: 2020-09-24 | End: 2020-09-26 | Stop reason: HOSPADM

## 2020-09-24 RX ORDER — CEFAZOLIN SODIUM 1 G/3ML
1 INJECTION, POWDER, FOR SOLUTION INTRAMUSCULAR; INTRAVENOUS SEE ADMIN INSTRUCTIONS
Status: DISCONTINUED | OUTPATIENT
Start: 2020-09-24 | End: 2020-09-24 | Stop reason: HOSPADM

## 2020-09-24 RX ORDER — MAGNESIUM HYDROXIDE 1200 MG/15ML
LIQUID ORAL PRN
Status: DISCONTINUED | OUTPATIENT
Start: 2020-09-24 | End: 2020-09-24 | Stop reason: HOSPADM

## 2020-09-24 RX ORDER — ONDANSETRON 4 MG/1
4 TABLET, ORALLY DISINTEGRATING ORAL EVERY 30 MIN PRN
Status: DISCONTINUED | OUTPATIENT
Start: 2020-09-24 | End: 2020-09-24 | Stop reason: HOSPADM

## 2020-09-24 RX ORDER — ONDANSETRON 2 MG/ML
4 INJECTION INTRAMUSCULAR; INTRAVENOUS EVERY 30 MIN PRN
Status: DISCONTINUED | OUTPATIENT
Start: 2020-09-24 | End: 2020-09-24 | Stop reason: HOSPADM

## 2020-09-24 RX ORDER — LIDOCAINE 40 MG/G
CREAM TOPICAL
Status: DISCONTINUED | OUTPATIENT
Start: 2020-09-24 | End: 2020-09-26 | Stop reason: HOSPADM

## 2020-09-24 RX ADMIN — SODIUM CHLORIDE, POTASSIUM CHLORIDE, SODIUM LACTATE AND CALCIUM CHLORIDE: 600; 310; 30; 20 INJECTION, SOLUTION INTRAVENOUS at 20:38

## 2020-09-24 RX ADMIN — CEFAZOLIN SODIUM 2 G: 2 INJECTION, SOLUTION INTRAVENOUS at 23:48

## 2020-09-24 RX ADMIN — DEXMEDETOMIDINE HYDROCHLORIDE 8 MCG: 100 INJECTION, SOLUTION INTRAVENOUS at 16:55

## 2020-09-24 RX ADMIN — SODIUM CHLORIDE, POTASSIUM CHLORIDE, SODIUM LACTATE AND CALCIUM CHLORIDE: 600; 310; 30; 20 INJECTION, SOLUTION INTRAVENOUS at 08:34

## 2020-09-24 RX ADMIN — ASPIRIN 325 MG: 325 TABLET, DELAYED RELEASE ORAL at 20:38

## 2020-09-24 RX ADMIN — CHLOROPROCAINE HYDROCHLORIDE 50 MG: 20 INJECTION, SOLUTION EPIDURAL; INFILTRATION; INTRACAUDAL; PERINEURAL at 17:01

## 2020-09-24 RX ADMIN — CEFAZOLIN SODIUM 2 G: 2 INJECTION, SOLUTION INTRAVENOUS at 17:05

## 2020-09-24 RX ADMIN — PROPOFOL 50 MCG/KG/MIN: 10 INJECTION, EMULSION INTRAVENOUS at 17:01

## 2020-09-24 RX ADMIN — FENTANYL CITRATE 25 MCG: 50 INJECTION, SOLUTION INTRAMUSCULAR; INTRAVENOUS at 17:05

## 2020-09-24 RX ADMIN — DEXMEDETOMIDINE HYDROCHLORIDE 4 MCG: 100 INJECTION, SOLUTION INTRAVENOUS at 17:05

## 2020-09-24 RX ADMIN — FENTANYL CITRATE 25 MCG: 50 INJECTION, SOLUTION INTRAMUSCULAR; INTRAVENOUS at 16:55

## 2020-09-24 RX ADMIN — SODIUM CHLORIDE, POTASSIUM CHLORIDE, SODIUM LACTATE AND CALCIUM CHLORIDE: 600; 310; 30; 20 INJECTION, SOLUTION INTRAVENOUS at 13:38

## 2020-09-24 RX ADMIN — ONDANSETRON 4 MG: 2 INJECTION INTRAMUSCULAR; INTRAVENOUS at 17:43

## 2020-09-24 RX ADMIN — ACETAMINOPHEN 975 MG: 325 TABLET, FILM COATED ORAL at 21:27

## 2020-09-24 ASSESSMENT — ACTIVITIES OF DAILY LIVING (ADL)
ADLS_ACUITY_SCORE: 27
ADLS_ACUITY_SCORE: 27
ADLS_ACUITY_SCORE: 26
ADLS_ACUITY_SCORE: 26
ADLS_ACUITY_SCORE: 28

## 2020-09-24 ASSESSMENT — COPD QUESTIONNAIRES: COPD: 0

## 2020-09-24 ASSESSMENT — LIFESTYLE VARIABLES: TOBACCO_USE: 0

## 2020-09-24 NOTE — ANESTHESIA POSTPROCEDURE EVALUATION
Patient: Raulito A Engelking    Procedure(s):  PERCUTANEOUS SCREW FIXATION    Diagnosis:Femoral neck fracture (H) [S72.009A]  Diagnosis Additional Information: No value filed.    Anesthesia Type:  Spinal    Note:  Anesthesia Post Evaluation    Patient location during evaluation: PACU  Patient participation: Able to fully participate in evaluation  Level of consciousness: awake and alert  Pain management: adequate  Airway patency: patent  Cardiovascular status: acceptable and hemodynamically stable  Respiratory status: acceptable and nasal cannula  Hydration status: euvolemic  PONV: none     Anesthetic complications: None    Comments: Spinal regressing appropriately.        Last vitals:  Vitals:    09/24/20 1800 09/24/20 1810 09/24/20 1820   BP: 95/56 101/67    Pulse: 54 56 52   Resp: 15 14 16   Temp:      SpO2: 99% 91% 98%         Electronically Signed By: Jose Taylor MD  September 24, 2020  6:21 PM

## 2020-09-24 NOTE — PLAN OF CARE
Summary:     DATE & TIME: 9/23/20 1900-2330  Cognitive Concerns/ Orientation : A&Ox1   BEHAVIOR & AGGRESSION TOOL COLOR: green  CIWA SCORE: n/a   ABNL VS/O2: vss on RA  MOBILITY: bedrest until ortho sees patient  PAIN MANAGMENT: denies pain  DIET: Reg- NPO at MN  BOWEL/BLADDER: incontinent at times of bladder, no BM this shift  ABNL LAB/BG: none  DRAIN/DEVICES: PIV x2. L PIV infusing LR at 75mL/hr, R PIV SL  TELEMETRY RHYTHM: n/a  SKIN: WDL ex scabs, bruising and abrasion on L knee  TESTS/PROCEDURES: Possibly surgery tomorrow- pending ortho consult  D/C DAY/GOALS/PLACE: pending  OTHER IMPORTANT INFO: Neuro checks q4h intact. MRI cancelled per neurology. Head CT neg. Pt bladder scanned post void- PVR has been < 300mL. PT, OT, SW consulted.

## 2020-09-24 NOTE — PLAN OF CARE
PT and OT: Received eval orders.  Pt admit with femoral neck fx, plan for OR today.  Holding evals and will look for updated post op orders.

## 2020-09-24 NOTE — PROGRESS NOTES
Aware of patient    Planning for right femoral neck fracture CRPP tomorrow with Dr. Aranda (9/24)    May have diet this evening  NPO after midnight  Bed rest  Pain medication as needed, limit narcotics as able  COVID test STAT for urgent surgery  Pre-op optimization per hospitalist  Has been evaluated by neuro with no further interventions    Flor LU

## 2020-09-24 NOTE — ANESTHESIA PREPROCEDURE EVALUATION
Anesthesia Pre-Procedure Evaluation    Patient: Raulito Iyer   MRN: [id number] : 10/23/1930          Preoperative Diagnosis: Femoral neck fracture (H) [S72.009A]    Procedure(s):  PERCUTANEOUS SCREW FIXATION    Past Medical History:   Diagnosis Date     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 behavioral disturbance (H) 2017     Past Surgical History:   Procedure Laterality Date     NO HISTORY OF SURGERY         Anesthesia Evaluation     .             ROS/MED HX    ENT/Pulmonary:      (-) tobacco use, asthma and COPD   Neurologic:     (+)dementia,     Cardiovascular:        (-) hypertension   METS/Exercise Tolerance:     Hematologic:        (-) anemia   Musculoskeletal:         GI/Hepatic:        (-) GERD and liver disease   Renal/Genitourinary:      (-) renal disease   Endo:      (-) Type II DM, thyroid disease and obesity   Psychiatric:         Infectious Disease:         Malignancy:         Other:                          Physical Exam      Airway   Mallampati: II  TM distance: >3 FB  Neck ROM: full    Dental   Comment: Poor dentition; couple missing, denies loose teeth    Cardiovascular   Rhythm and rate: regular      Pulmonary             Lab Results   Component Value Date    WBC 12.1 (H) 2020    HGB 13.6 2020    HCT 41.0 2020     2020     2020    POTASSIUM 3.5 2020    CHLORIDE 111 (H) 2020    CO2 25 2020    BUN 17 2020    CR 0.83 2020    GLC 96 2020    SHERRIE 7.8 (L) 2020    PTT 33 2020    INR 1.23 (H) 2020       Preop Vitals  BP Readings from Last 3 Encounters:   20 108/60   17 (!) 148/104   17 124/79    Pulse Readings from Last 3 Encounters:   20 59   17 80   13 53      Resp Readings from Last 3 Encounters:   20 16   17 16   13 20    SpO2 Readings from Last 3 Encounters:   20 95%   17 97%   17 95%      Temp Readings from Last 1 Encounters:    09/24/20 37.1  C (98.8  F) (Oral)    Ht Readings from Last 1 Encounters:   09/24/20 1.829 m (6')      Wt Readings from Last 1 Encounters:   09/23/20 82.3 kg (181 lb 7 oz)    Estimated body mass index is 24.61 kg/m  as calculated from the following:    Height as of this encounter: 1.829 m (6').    Weight as of this encounter: 82.3 kg (181 lb 7 oz).       Anesthesia Plan      History & Physical Review  History and physical reviewed and following examination; no interval change.    ASA Status:  2 .    NPO Status:  > 8 hours    Plan for Spinal     Chloroprocaine spinal and low dose sedation        Postoperative Care  Postoperative pain management:  Multi-modal analgesia.      Consents  Anesthetic plan, risks, benefits and alternatives discussed with:  Patient and Daughter/Son..                 Jose Taylor MD

## 2020-09-24 NOTE — OP NOTE
[facility]  Orthopedic Operative Note    Percutaneous Screw Fixation Femoral Neck Fracture    Raulito Iyer MRN# [id number]   YOB: 1930  Procedure Date:  9/24/2020  Age: 89 year old     PREOPERATIVE DIAGNOSIS:  Right valgus impacted femoral neck fracture.    POSTOPERATIVE DIAGNOSIS:  Right valgus impacted femoral neck fracture.    PROCEDURE PERFORMED:  In-situ percutaneous screw fixation, right valgus impacted femoral neck fracture.    SURGEON:  Andrew Aranda MD    FIRST ASSISTANT:  Danna Ramirez PA-C. Her assistance was critical during transfer, positioning, preparation, draping, surgical approach, fracture reduction, implant placement, closure and placement of dressings. Her assistance allowed me to operate efficiently, decreasing surgical time and risk.     ANESTHESIA:  General    EBL: 25 mL    COMPLICATIONS: None    DISPOSITION: Post Anesthesia Care Unit     CONDITION: Stable     INDICATIONS:  Raulito Iyer  is a 89 year old-year-old male with dementia with right valgus impacted femoral neck fracture after fall from standing height.  Discussed both operative and nonoperative management.  Risks of surgery discussed included but not limited to bleeding, infection, damage to surrounding neurovascular structures, nonunion, malunion, avascular necrosis, leg length inequality, need for revision surgery including partial versus total hip arthroplasty, blood clots, pulmonary embolus. No guarantees given or implied. Patient daughter elects to proceed. Signed informed consent placed on chart.    IMPLANTS:  Implant Name Type Inv. Item Serial No.  Lot No. LRB No. Used Action   IMP SCR SYN CAN 7.3X32 [id number] .890 [employer]/[place] IMP SCR SYN CAN 7.3X32 [id number] .890  [facility] 41 07 21 [id number] Right 2 Implanted   IMP SCR SYN CAN 7.3X32 [id number] .895 Met[employer] IMP SCR SYN CAN 7.3X32 [id number] .895   [facility] 41 07 21 [id number] Right 1 Implanted       PROCEDURE: Patient was identified in the preoperative holding area and the operative site was marked with indelible marker. he is brought in the operating room and placed supine on the operating room table.  After induction of general anesthesia all bony prominences well-padded.  A bump was placed beneath the buttock to facilitate fluoroscopic imaging.  The hip was prepped and draped in normal sterile fashion.  Antibiotic administration was confirmed and timeout was completed.  Under fluoroscopic guidance a 2 cm incision was made at the appropriate level on the lateral aspect of the proximal femur.  Incision carried sharply through skin subcutaneous tissue down the fascia.  Fascia incised in line with the skin incision and the correct trajectory for guidewire placement.  Inverted triangle guidewire configuration was placed by first inserting the inferior calcar wire followed by the superior posterior wire followed by the superior anterior wire.  The wires were measured and sequential drilling and filling was performed beginning with the superior posterior screw followed by the superior anterior screw followed finally by the inferior calcar screw.  This order a screw insertion was performed in order to maintain the valgus alignment and prevent potential gapping superiorly which could lead to delayed or nonunion. Final fluoroscopic views in AP, lateral and multiple obliques were performed to confirm appropriate screw placement and extra-articular position of all screws.  Guidewires were removed and wound was copiously irrigated with sterile saline and closed in layers with 0 Vicryl for fascia, 2-0 Vicryl subcu tissues and 3-0 Monocryl and Dermabond for skin.  Island dressing applied. Patient awakened from general anesthesia transferred to a hospital stretcher and taken to recovery room in stable condition.  There were no complications and the patient tolerated  well.    PLAN:  Weight-bear as tolerated  Range of motion as tolerated  24 hours IV antibiotics per standard postop protocol  DVT prophylaxis  Pain control  Physical therapy and Occupational Therapy consults  Case management and social work  Hospitalist jolantadante  Remove dressing in 5 days  Leave wound open to air after 5 days  Return to clinic in 2 weeks with repeat x-rays AP and lateral of right hip    Andrew Aranda MD  Orthopedic Trauma and Arthroplasty  [facility]  [phone]

## 2020-09-24 NOTE — PLAN OF CARE
Summary:     DATE & TIME: 9/24/20 (3530-7398)  Cognitive Concerns/ Orientation : A&O to self only (hx dementia).   BEHAVIOR & AGGRESSION TOOL COLOR: Yellow/green (confused, restless at times)  CIWA SCORE: n/a   ABNL VS/O2: VSS on RA  MOBILITY: Bedrest   PAIN MANAGMENT: Denies pain  DIET: NPO   BOWEL/BLADDER: Incontinent at times, uses urinal   ABNL LAB/BG: none  DRAIN/DEVICES: PIV infusing LR at 75mL/hr  TELEMETRY RHYTHM: NA  SKIN: WDL ex scabs, bruising and abrasion on L knee  TESTS/PROCEDURES: Surgery today   D/C DAY/GOALS/PLACE: Discharge pending   OTHER IMPORTANT INFO: Neuro checks intact. Ortho following. PT, OT, SW consulted. Daughter at bedside.

## 2020-09-24 NOTE — ANESTHESIA CARE TRANSFER NOTE
Patient: Raulito A Engelking    Procedure(s):  PERCUTANEOUS SCREW FIXATION    Diagnosis: Femoral neck fracture (H) [S72.009A]  Diagnosis Additional Information: No value filed.    Anesthesia Type:   Spinal     Note:  Airway :Face Mask  Patient transferred to:PACU  Comments: At end of procedure, spontaneous respirations, patient alert to voice, able to follow commands. Oxygen via facemask at 6 liters per minute to PACU. Oxygen tubing connected to wall O2 in PACU, SpO2, NiBP, and EKG monitors and alarms on and functioning, Benny Hugger warmer connected to patient gown, report on patient's clinical status given to PACU RN, RN questions answered.Handoff Report: Identifed the Patient, Identified the Reponsible Provider, Reviewed the pertinent medical history, Discussed the surgical course, Reviewed Intra-OP anesthesia mangement and issues during anesthesia, Set expectations for post-procedure period and Allowed opportunity for questions and acknowledgement of understanding      Vitals: (Last set prior to Anesthesia Care Transfer)    CRNA VITALS  9/24/2020 1713 - 9/24/2020 1751      9/24/2020             NIBP:  (!) 79/60    NIBP Mean:  69                Electronically Signed By: GEOFFREY Flores CRNA  September 24, 2020  5:51 PM

## 2020-09-24 NOTE — PROGRESS NOTES
Care Coordinator consult for return to memory care has been acknowledged.  Pt is having surgical intervention for his femoral neck fracture.  He will then transfer to the Orthopedic floor.  Will need to evaluate his ability for mobility post op, before formulating a discharge plan.

## 2020-09-24 NOTE — PLAN OF CARE
Summary:     DATE & TIME: 9/24/20 8084-8085  Cognitive Concerns/ Orientation : A&O to self only (hx dementia).   BEHAVIOR & AGGRESSION TOOL COLOR: Yellow/green (confused, restless at times)  CIWA SCORE: n/a   ABNL VS/O2: VSS on RA  MOBILITY: Bedrest   PAIN MANAGMENT: Denies pain  DIET: NPO except for meds  BOWEL/BLADDER: Continent this shift; minimal voiding, bladder scan 126 mL.  ABNL LAB/BG: none  DRAIN/DEVICES: PIV infusing LR at 75mL/hr  TELEMETRY RHYTHM: NA  SKIN: WDL ex scabs, bruising and abrasion on L knee  TESTS/PROCEDURES: CRPP R at 1430 today  D/C DAY/GOALS/PLACE: Discharge pending   OTHER IMPORTANT INFO: Neuro checks q4h intact. Ortho following. PT, OT, SW consulted.

## 2020-09-24 NOTE — PROGRESS NOTES
[facility]  Hospitalist Progress Note        Quirino Collado MD   09/24/2020        Interval History:      - no acute issues overnight; planned for OR 9/24 for right femoral neck fracture         Assessment and Plan:        Raulito Iyer is a 89 year old male with PMH of dementia, TIA not on any active meds PTA who was sent from his memory care facility on 9/23 after staff noted acute weakness. Initial concern was for TIA due to conflicting reports of weakness (noted left sided weakness at TCU; in ED right weakness more on RLE which eventually was though to be due to right femoral neck fracture)    Acute right femoral neck fracture  - Upon road test in the ED, the patient was quite slow to move, particularly with his right lower extremity.  An abrasion was noted on the lateral aspect of the right knee.  - XR pelvis shows suboptimal evaluation of right femoral neck, CT was obtained which showed acute right femoral neck fracture.  --tylenol, ultram for unrelieved pain  -- evaluated by orthopedics, planned for ORIF 9/24  - PT eval post op; management per ortho     TIA  - patient had acute onset generalized weakness as noted by staff.  He also noticed acute left facial droop and left lower extremity weakness  - Initial concern was for TIA due to conflicting reports of weakness (noted left sided weakness at TCU; in ED right weakness more on RLE which eventually was though to be due to right femoral neck fracture)  - was seen as a Code stroke in ED; imagings UR showing patent arteries in the neck without evidence of dissection, patent proximal major intercranial arteries, unremarkable CT perfusion, and no evidence of acute intracranial hemorrhage, mass or herniation  - neurology following; suspect that difficulty ambulating was secondary to right femoral neck fracture rather than TIA vs stroke and suggested MRI brain only if new suspicion for focal deficets, otherwise would not pursue  further TIA/stroke evaluation at this time  - marked leukocytosis on admission with wbc 19 likely a stress response, wbc--->12; UR UA, COVID negative; no clear infective source     Dementia  - confused at baseline with significant short-term memory loss, is frequently repetitive staff; at baseline mentation  --At high risk for delirium, monitor closely    DVT Prophylaxis: Pneumatic Compression Devices    Code Status: DNR/I     Disposition Plan: 2-3 days pending surgery, therapy eval and ortho clearance; might need memory care TCU                   Physical Exam:      Blood pressure 113/66, pulse 62, temperature 98.3  F (36.8  C), temperature source Oral, resp. rate 16, weight 82.3 kg (181 lb 7 oz), SpO2 91 %.  Vitals:    09/23/20 1259   Weight: 82.3 kg (181 lb 7 oz)     Vital Signs with Ranges  Temp:  [97.6  F (36.4  C)-99.4  F (37.4  C)] 98.3  F (36.8  C)  Pulse:  [] 62  Resp:  [12-21] 16  BP: ()/(51-78) 113/66  SpO2:  [91 %-95 %] 91 %  I/O's Last 24 hours  I/O last 3 completed shifts:  In: 860 [I.V.:860]  Out: 275 [Urine:275]    Constitutional: Alert, awake and oriented to self only; resting comfortably in no apparent distress   HEENT: Pupils equal and reactive to light and accomodation, neck supple    Oral cavity: Moist mucosa   Cardiovascular: Normal s1 s2, regular rate and rhythm, no murmur   Lungs: B/l clear to auscultation, no wheezes or crepitations   Abdomen: Soft, nt, nd, no guarding, rigidity or rebound; BS +   LE : No edema   Musculoskeletal: Power 5/5 in all extremities except restricted right hip movement due to pain   Neuro: No focal neurological deficits noted            Medications:          ceFAZolin  1 g Intravenous See Admin Instructions     ceFAZolin  2 g Intravenous Pre-Op/Pre-procedure x 1 dose     sodium chloride (PF)  3 mL Intracatheter Q8H     PRN Meds: acetaminophen, acetaminophen, bisacodyl, lidocaine (buffered or not buffered), melatonin, naloxone, ondansetron **OR**  ondansetron, polyethylene glycol, senna-docusate **OR** senna-docusate, sodium chloride (PF), traMADol         Data:      All new lab and imaging data was reviewed.   Recent Labs   Lab Test 09/24/20  0830 09/23/20  1308 07/24/17  1605   WBC 12.1* 19.3* 9.6   HGB 13.6 16.6 16.9   MCV 95 95 93    294 291   INR  --  1.23* 0.98      Recent Labs   Lab Test 09/24/20  0830 09/23/20  1308 07/24/17  1605    142 142   POTASSIUM 3.5 3.8 3.9   CHLORIDE 111* 109 107   CO2 25 24 24   BUN 17 18 9   CR 0.83 0.85 0.73   ANIONGAP 5 9 11   SHERRIE 7.8* 9.0 8.8   GLC 96 174* 120*     Recent Labs   Lab Test 07/24/17  1605 08/27/12  0100   TROPI <0.015  The 99th percentile for upper reference range is 0.045 ug/L.  Troponin values in   the range of 0.045 - 0.120 ug/L may be associated with risks of adverse   clinical events.   0.017

## 2020-09-24 NOTE — ANESTHESIA PROCEDURE NOTES
Procedure note : intrathecal      Staff -   Anesthesiologist:  Jose Taylor MD  Performed By: Anesthesiologist  Pre-Procedure  Performed by Jose Taylor MD  Location: OR      Pre-Anesthestic Checklist: patient identified, IV checked, site marked, risks and benefits discussed, informed consent, monitors and equipment checked, pre-op evaluation and at physician/surgeon's request    Timeout  Correct Patient: Yes   Correct Procedure: Yes   Correct Site: Yes   Correct Laterality: Yes   Correct Position: Yes   Site Marked: Yes   .   Procedure Documentation    .    Procedure: intrathecal, .   Patient Position:sitting Insertion Site:L3-4  (midline approach)     Patient Prep/Sterile Barriers; mask, sterile gloves, povidone-iodine 7.5% surgical scrub, patient draped.  .  Needle:  Spinal Needle (gauge): 24  Spinal/LP Needle Length (inches): 3.5 # of attempts: 1 and # of redirects:  Introducer used Introducer: 20 G .        Assessment/Narrative  Paresthesias: No.  .  .  clear CSF fluid removed . Time Injected:while sitting   Comments:  Patient sitting on edge of OR bed, lower back cleaned and prepped in sterile fashion with betadine. 1% lido used to numb area. Introducer placed, spinal needle through introducer. Appropriate flow of CSF and confirmed with aspiration via syringe. Spinal dose given, 50 mg 2% chloroprocaine. No complications.

## 2020-09-24 NOTE — CONSULTS
[facility]    Orthopedic Consultation    Raulito Iyer MRN# [id number]   Age: 89 year old YOB: 1930     Date of Admission:  9/23/2020    Reason for consult: Right femoral neck fracture       Requesting physician: Patricia García       Level of consult: Consult, follow and place orders           Assessment and Plan:   Assessment:   Right femoral neck fracture        Plan:   Patient scheduled to have right hip CRPP later today.  Surgeon: Dr Aranda  Discussed risks and benefits with the patient's daughter, Catia.  She agrees to proceed with the procedure.    NPO effective now  Bed rest, NWB Right LE until post-op  Pain medication as needed, limit narcotics as able  COVID test STAT for urgent surgery  Pre-op optimization per hospitalist  Has been evaluated by neuro with no further interventions           Chief Complaint:   Right hip pain          History of Present Illness:   HPI obtained via daughter and medical records.  Raulito Iyer is a 89 year old male who presented from his memory care facility yesterday with worsening confusion, weakness with ambulation and reported left facial droop.  Patient has a past medical history of TIA, dementia.  Patient moves without assistance or equipment at baseline.  Yesterday a.m. he was unable to get out of his recliner without assistance from staff.  When the patient was upright, the patient noted slight left facial drooping, and dragging of his left leg.  Oddly enough, the patient arrived in the emergency department, his symptoms were grossly resolved per his daughter who met him there.  Stat neuroimaging does not show any acute abnormalities.  Upon a road test in the emergency department, the patient was unable to ambulate without significant help, and appeared to have right lower extremity pain and weakness.  Initial x-ray was suboptimal.  CT scan imaging was obtained showing acute right femoral neck fracture.         Past Medical  History:     Past Medical History:   Diagnosis Date     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 behavioral disturbance (H) 4/5/2017             Past Surgical History:     Past Surgical History:   Procedure Laterality Date     NO HISTORY OF SURGERY               Social History:     Social History     Tobacco Use     Smoking status: Former Smoker     Smokeless tobacco: Never Used   Substance Use Topics     Alcohol use: Yes     Alcohol/week: 3.0 - 6.0 standard drinks     Types: 3 - 6 Standard drinks or equivalent per week             Family History:   History reviewed. No pertinent family history.          Immunizations:     VACCINE/DOSE   Diptheria   DPT   DTAP   HBIG   Hepatitis A   Hepatitis B   HIB   Influenza   Measles   Meningococcal   MMR   Mumps   Pneumococcal   Polio   Rubella   Small Pox   TDAP   Varicella   Zoster             Allergies:   No Known Allergies          Medications:     Current Facility-Administered Medications   Medication     acetaminophen (TYLENOL) Suppository 650 mg     acetaminophen (TYLENOL) tablet 650 mg     bisacodyl (DULCOLAX) Suppository 10 mg     ceFAZolin (ANCEF) 1 g vial to attach to  ml bag for ADULT or 50 ml bag for PEDS     ceFAZolin (ANCEF) intermittent infusion 2 g in 100 mL dextrose PRE-MIX     lactated ringers infusion     lactated ringers infusion     lidocaine 1 % 0.1-1 mL     melatonin tablet 1 mg     naloxone (NARCAN) injection 0.1-0.4 mg     ondansetron (ZOFRAN-ODT) ODT tab 4 mg    Or     ondansetron (ZOFRAN) injection 4 mg     polyethylene glycol (MIRALAX) Packet 17 g     senna-docusate (SENOKOT-S/PERICOLACE) 8.6-50 MG per tablet 1 tablet    Or     senna-docusate (SENOKOT-S/PERICOLACE) 8.6-50 MG per tablet 2 tablet     sodium chloride (PF) 0.9% PF flush 3 mL     sodium chloride (PF) 0.9% PF flush 3 mL     traMADol (ULTRAM) half-tab 25-50 mg             Review of Systems:   ROS:  10 point ROS neg other than the symptoms noted above in the HPI.          Physical Exam:   All  vitals have been reviewed  Patient Vitals for the past 24 hrs:   BP Temp Temp src Pulse Resp SpO2 Weight   09/24/20 0736 113/66 98.3  F (36.8  C) Oral 62 16 91 % --   09/24/20 0105 92/51 99.2  F (37.3  C) Oral 75 16 95 % --   09/23/20 2137 -- -- -- -- 16 -- --   09/23/20 1953 113/64 99.1  F (37.3  C) Oral 79 16 92 % --   09/23/20 1700 115/68 99.4  F (37.4  C) Oral 79 16 91 % --   09/23/20 1348 -- -- -- 90 17 94 % --   09/23/20 1342 -- -- -- 84 12 93 % --   09/23/20 1332 (!) 143/69 -- -- 82 21 -- --   09/23/20 1315 121/69 -- -- 86 16 92 % --   09/23/20 1259 (!) 141/78 97.6  F (36.4  C) Oral 102 16 93 % 82.3 kg (181 lb 7 oz)       Intake/Output Summary (Last 24 hours) at 9/24/2020 1106  Last data filed at 9/24/2020 0907  Gross per 24 hour   Intake 860 ml   Output 325 ml   Net 535 ml         Physical Exam   Temp: 98.3  F (36.8  C) Temp src: Oral BP: 113/66 Pulse: 62   Resp: 16 SpO2: 91 % O2 Device: None (Room air) Oxygen Delivery: 2 LPM  Vital Signs with Ranges  Temp:  [97.6  F (36.4  C)-99.4  F (37.4  C)] 98.3  F (36.8  C)  Pulse:  [] 62  Resp:  [12-21] 16  BP: ()/(51-78) 113/66  SpO2:  [91 %-95 %] 91 %  181 lbs 7.02 oz    On physical exam of the right lower extremity, patient's leg is resting in a neutral position.  Skin abrasion along lateral knee.  Patient is able to dorsi and plantarflex both ankles with equal resistance.  Full sensation to light touch on the left versus right lower extremities.  Distal pulses are intact and equal bilaterally.            Data:   All laboratory data reviewed  Results for orders placed or performed during the hospital encounter of 09/23/20   CT Head Perfusion w Contrast     Status: None    Narrative    CT ANGIOGRAM OF THE HEAD AND NECK WITH CONTRAST  CT HEAD PERFUSION WITH CONTRAST September 23, 2020 1:24 PM     HISTORY: Code Stroke.    TECHNIQUE: CT angiography with an injection of 70mL Isovue-370  (accession [id number]), 50mL Isovue-370 (accession [id number]) IV  with  scans through the head and neck. Images were transferred to a separate  3-D workstation where multiplanar reformations and 3-D images were  created. Estimates of carotid stenoses are made relative to the distal  internal carotid artery diameters except as noted. Radiation dose for  this scan was reduced using automated exposure control, adjustment of  the mA and/or kV according to patient size, or iterative  reconstruction technique.     Perfusion scans were performed with injection of additional IV  contrast. These images were processed on a separate 3-D workstation.     COMPARISON: [facility] 7/24/2017.     CT HEAD FINDINGS: No contrast enhancing lesions. CT perfusion images  of the head are unremarkable.     CT ANGIOGRAM HEAD FINDINGS: The major intracranial arteries including  the proximal branches of the anterior cerebral, middle cerebral, and  posterior cerebral arteries appear patent without vascular cutoff. No  aneurysm identified. Multifocal stenoses of the intracranial vessels  including moderate stenoses of the left P1 and P2 segments, moderate  stenosis of the mid right P2 segment, and multiple additional mild  intracranial stenoses. Venous circulation is unremarkable.     CT ANGIOGRAM NECK FINDINGS: Scattered atherosclerotic calcification in  the aortic arch without significant stenosis at the origins of the  great vessels.     Right carotid artery: The right common and internal carotid arteries  are patent. Mild atherosclerotic disease at the carotid bifurcation  and proximal internal carotid artery without significant stenosis by  NASCET criteria.     Left carotid artery: The left common and internal carotid arteries are  patent. Mild atherosclerotic disease at the carotid bifurcation and  proximal internal carotid artery without significant stenosis by  NASCET criteria.     Vertebral arteries: Vertebral arteries are patent without evidence of  dissection. No significant stenosis.     Other findings:  Marked multilevel degenerative changes throughout the  spine.       Impression    IMPRESSION:   1. Patent arteries in the neck without evidence of dissection. Mild  atherosclerotic disease in the carotid arteries bilaterally without  significant stenosis by NASCET criteria.  2. Patent proximal major intracranial arteries without vascular  cutoff. Multifocal mild to moderate stenoses of the intracranial  vessels likely due to intracranial atherosclerotic disease. No  aneurysm identified.  3. Unremarkable CT perfusion images of the head.     Results discussed with Michaelle Jarvis at 1:36 PM on 9/23/2020.     JESSICA AGUDELO MD   CT Head w/o Contrast     Status: None    Narrative    CT SCAN OF THE HEAD WITHOUT CONTRAST   9/23/2020 1:20 PM     HISTORY: Code Stroke.    TECHNIQUE:  Axial images of the head and coronal reformations without  IV contrast material. Radiation dose for this scan was reduced using  automated exposure control, adjustment of the mA and/or kV according  to patient size, or iterative reconstruction technique.    COMPARISON: Brain MR 7/24/2017.    FINDINGS: No evidence of acute intracranial hemorrhage. No mass effect  or midline shift. No abnormal extra-axial fluid collection. Moderate  diffuse parenchymal volume loss. Nonspecific white matter changes  likely due to chronic microvascular ischemic disease. Ventricular size  is within normal limits without evidence of hydrocephalus.    The visualized portions of the sinuses and mastoids appear normal. The  bony calvarium and bones of the skull base appear intact.       Impression    IMPRESSION:     1. No evidence of acute intracranial hemorrhage, mass, or herniation.  2. Diffuse parenchymal volume loss and white matter changes likely due  to chronic microvascular ischemic disease.    JESSICA AGUDELO MD   CTA Head Neck with Contrast     Status: None    Narrative    CT ANGIOGRAM OF THE HEAD AND NECK WITH CONTRAST  CT HEAD PERFUSION WITH CONTRAST September 23,  2020 1:24 PM     HISTORY: Code Stroke.    TECHNIQUE: CT angiography with an injection of 70mL Isovue-370  (accession [id number]), 50mL Isovue-370 (accession [id number]) IV with  scans through the head and neck. Images were transferred to a separate  3-D workstation where multiplanar reformations and 3-D images were  created. Estimates of carotid stenoses are made relative to the distal  internal carotid artery diameters except as noted. Radiation dose for  this scan was reduced using automated exposure control, adjustment of  the mA and/or kV according to patient size, or iterative  reconstruction technique.     Perfusion scans were performed with injection of additional IV  contrast. These images were processed on a separate 3-D workstation.     COMPARISON: [facility] 7/24/2017.     CT HEAD FINDINGS: No contrast enhancing lesions. CT perfusion images  of the head are unremarkable.     CT ANGIOGRAM HEAD FINDINGS: The major intracranial arteries including  the proximal branches of the anterior cerebral, middle cerebral, and  posterior cerebral arteries appear patent without vascular cutoff. No  aneurysm identified. Multifocal stenoses of the intracranial vessels  including moderate stenoses of the left P1 and P2 segments, moderate  stenosis of the mid right P2 segment, and multiple additional mild  intracranial stenoses. Venous circulation is unremarkable.     CT ANGIOGRAM NECK FINDINGS: Scattered atherosclerotic calcification in  the aortic arch without significant stenosis at the origins of the  great vessels.     Right carotid artery: The right common and internal carotid arteries  are patent. Mild atherosclerotic disease at the carotid bifurcation  and proximal internal carotid artery without significant stenosis by  NASCET criteria.     Left carotid artery: The left common and internal carotid arteries are  patent. Mild atherosclerotic disease at the carotid bifurcation and  proximal internal carotid artery without  significant stenosis by  NASCET criteria.     Vertebral arteries: Vertebral arteries are patent without evidence of  dissection. No significant stenosis.     Other findings: Marked multilevel degenerative changes throughout the  spine.       Impression    IMPRESSION:   1. Patent arteries in the neck without evidence of dissection. Mild  atherosclerotic disease in the carotid arteries bilaterally without  significant stenosis by NASCET criteria.  2. Patent proximal major intracranial arteries without vascular  cutoff. Multifocal mild to moderate stenoses of the intracranial  vessels likely due to intracranial atherosclerotic disease. No  aneurysm identified.  3. Unremarkable CT perfusion images of the head.     Results discussed with Michaelle Jarvis at 1:36 PM on 9/23/2020.     JESSICA AGUDELO MD   XR Chest 2 Views     Status: None    Narrative    CHEST TWO VIEWS 9/23/2020 2:25 PM     HISTORY: Weakness.    COMPARISON: August 27, 2012       Impression    IMPRESSION:  There are no acute infiltrates. The cardiac silhouette is  not enlarged. Pulmonary vasculature is unremarkable.     FRANCISCA SANTOS MD   XR Pelvis 1/2 Views     Status: None    Narrative    PELVIS ONE TO TWO VIEWS  9/23/2020 2:26 PM     HISTORY:  Right leg weakness, ? groin pain.    FINDINGS: Lower lumbar spine degenerative change. Urinary tract  contrast. There is impression on the inferior aspect of the bladder,  presumably from an enlarged prostate.      Impression    IMPRESSION: Due to projection, the right femoral neck is suboptimally  evaluated. If there is clinical concern for fracture, I would  recommend additional views (internal rotation and/or frog-leg). There  is mild right hip joint space narrowing.    NATALY GONZALES MD   CT Pelvis Bone wo Contrast     Status: None    Narrative    CT PELVIS BONE WITHOUT CONTRAST9/23/2020 3:08 PM     HISTORY: Pain with ambulating.    TECHNIQUE: Axial images with reconstructions. No IV contrast.  Radiation dose for this  scan was reduced using automated exposure  control, adjustment of the mA and/or kV according to patient size, or  iterative reconstruction technique.    COMPARISON: None    FINDINGS: Fracture of the subcapital portion of the right femoral  neck, with minimal displacement and impaction. Lower lumbar spine  degenerative change. Chondrocalcinosis of the hips and symphysis  pubis, consistent with calcium pyrophosphate deposition disease.  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 Urinary tract  contrast. Prostate enlargement with impression on the bladder.  Osteopenia suspected. Bridging hyperostosis at the anterior aspects of  both sacroiliac joints.       Impression    IMPRESSION:  1. Right femoral neck fracture.  2. Additional findings discussed above.    NATALY GONZALES MD   Basic metabolic panel     Status: Abnormal   Result Value Ref Range    Sodium 142 133 - 144 mmol/L    Potassium 3.8 3.4 - 5.3 mmol/L    Chloride 109 94 - 109 mmol/L    Carbon Dioxide 24 20 - 32 mmol/L    Anion Gap 9 3 - 14 mmol/L    Glucose 174 (H) 70 - 99 mg/dL    Urea Nitrogen 18 7 - 30 mg/dL    Creatinine 0.85 0.66 - 1.25 mg/dL    GFR Estimate 77 >60 mL/min/[1.73_m2]    GFR Estimate If Black 89 >60 mL/min/[1.73_m2]    Calcium 9.0 8.5 - 10.1 mg/dL   CBC with platelets differential     Status: Abnormal   Result Value Ref Range    WBC 19.3 (H) 4.0 - 11.0 10e9/L    RBC Count 5.24 4.4 - 5.9 10e12/L    Hemoglobin 16.6 13.3 - 17.7 g/dL    Hematocrit 49.9 40.0 - 53.0 %    MCV 95 78 - 100 fl    MCH 31.7 26.5 - 33.0 pg    MCHC 33.3 31.5 - 36.5 g/dL    RDW 14.0 10.0 - 15.0 %    Platelet Count 294 150 - 450 10e9/L    Diff Method Automated Method     % Neutrophils 89.5 %    % Lymphocytes 7.3 %    % Monocytes 2.4 %    % Eosinophils 0.1 %    % Basophils 0.2 %    % Immature Granulocytes 0.5 %    Nucleated RBCs 0 0 /100    Absolute Neutrophil 17.3 (H) 1.6 - 8.3 10e9/L    Absolute Lymphocytes 1.4 0.8 - 5.3 10e9/L    Absolute Monocytes 0.5 0.0 - 1.3  10e9/L    Absolute Eosinophils 0.0 0.0 - 0.7 10e9/L    Absolute Basophils 0.0 0.0 - 0.2 10e9/L    Abs Immature Granulocytes 0.1 0 - 0.4 10e9/L    Absolute Nucleated RBC 0.0    INR     Status: Abnormal   Result Value Ref Range    INR 1.23 (H) 0.86 - 1.14   Partial thromboplastin time     Status: None   Result Value Ref Range    PTT 33 22 - 37 sec   UA with Microscopic     Status: Abnormal   Result Value Ref Range    Color Urine Yellow     Appearance Urine Clear     Glucose Urine Negative NEG^Negative mg/dL    Bilirubin Urine Negative NEG^Negative    Ketones Urine 40 (A) NEG^Negative mg/dL    Specific Gravity Urine 1.020 1.003 - 1.035    Blood Urine Moderate (A) NEG^Negative    pH Urine 6.0 5.0 - 7.0 pH    Protein Albumin Urine 30 (A) NEG^Negative mg/dL    Urobilinogen mg/dL 2.0 0.0 - 2.0 mg/dL    Nitrite Urine Negative NEG^Negative    Leukocyte Esterase Urine Moderate (A) NEG^Negative    Source Midstream Urine     WBC Urine 3 0 - 5 /HPF    RBC Urine 2 0 - 2 /HPF    Squamous Epithelial /HPF Urine 2 (H) 0 - 1 /HPF    Mucous Urine Present (A) NEG^Negative /LPF   Asymptomatic COVID-19 Virus (Coronavirus) by PCR     Status: None    Specimen: Nasopharyngeal   Result Value Ref Range    COVID-19 Virus PCR to [facility] - Source Nasopharyngeal     COVID-19 Virus PCR to [facility] - Result       Test received-See reflex to IDDL test SARS CoV2 (COVID-19) Virus RT-PCR   SARS-CoV-2 COVID-19 Virus (Coronavirus) RT-PCR Nasopharyngeal     Status: None    Specimen: Nasopharyngeal   Result Value Ref Range    SARS-CoV-2 Virus Specimen Source Nasopharyngeal     SARS-CoV-2 PCR Result NEGATIVE     SARS-CoV-2 PCR Comment       Testing was performed using the Xpert Xpress SARS-CoV-2 Assay on the Cepheid Gene-Xpert   Instrument Systems. Additional information about this Emergency Use Authorization (EUA)   assay can be found via the Lab Guide.     Basic metabolic panel     Status: Abnormal   Result Value Ref Range    Sodium 141 133 - 144 mmol/L     Potassium 3.5 3.4 - 5.3 mmol/L    Chloride 111 (H) 94 - 109 mmol/L    Carbon Dioxide 25 20 - 32 mmol/L    Anion Gap 5 3 - 14 mmol/L    Glucose 96 70 - 99 mg/dL    Urea Nitrogen 17 7 - 30 mg/dL    Creatinine 0.83 0.66 - 1.25 mg/dL    GFR Estimate 78 >60 mL/min/[1.73_m2]    GFR Estimate If Black 90 >60 mL/min/[1.73_m2]    Calcium 7.8 (L) 8.5 - 10.1 mg/dL   CBC with platelets differential     Status: Abnormal   Result Value Ref Range    WBC 12.1 (H) 4.0 - 11.0 10e9/L    RBC Count 4.33 (L) 4.4 - 5.9 10e12/L    Hemoglobin 13.6 13.3 - 17.7 g/dL    Hematocrit 41.0 40.0 - 53.0 %    MCV 95 78 - 100 fl    MCH 31.4 26.5 - 33.0 pg    MCHC 33.2 31.5 - 36.5 g/dL    RDW 14.1 10.0 - 15.0 %    Platelet Count 229 150 - 450 10e9/L    Diff Method Automated Method     % Neutrophils 68.8 %    % Lymphocytes 19.4 %    % Monocytes 8.2 %    % Eosinophils 2.6 %    % Basophils 0.3 %    % Immature Granulocytes 0.7 %    Nucleated RBCs 0 0 /100    Absolute Neutrophil 8.3 1.6 - 8.3 10e9/L    Absolute Lymphocytes 2.3 0.8 - 5.3 10e9/L    Absolute Monocytes 1.0 0.0 - 1.3 10e9/L    Absolute Eosinophils 0.3 0.0 - 0.7 10e9/L    Absolute Basophils 0.0 0.0 - 0.2 10e9/L    Abs Immature Granulocytes 0.1 0 - 0.4 10e9/L    Absolute Nucleated RBC 0.0    EKG 12 lead     Status: None   Result Value Ref Range    Interpretation ECG Click View Image link to view waveform and result           Attestation:  I have reviewed today's vital signs, notes, medications, labs and imaging with Dr. Aranda.  Amount of time performed on this consult: 30 minutes.    Rehana Stephen PA-C

## 2020-09-25 ENCOUNTER — APPOINTMENT (OUTPATIENT)
Dept: PHYSICAL THERAPY | Facility: CLINIC | Age: 85
DRG: 481 | End: 2020-09-25
Attending: NURSE PRACTITIONER
Payer: MEDICARE

## 2020-09-25 LAB
ANION GAP SERPL CALCULATED.3IONS-SCNC: 4 MMOL/L (ref 3–14)
BUN SERPL-MCNC: 22 MG/DL (ref 7–30)
CALCIUM SERPL-MCNC: 7.8 MG/DL (ref 8.5–10.1)
CHLORIDE SERPL-SCNC: 111 MMOL/L (ref 94–109)
CO2 SERPL-SCNC: 28 MMOL/L (ref 20–32)
CREAT SERPL-MCNC: 0.73 MG/DL (ref 0.66–1.25)
ERYTHROCYTE [DISTWIDTH] IN BLOOD BY AUTOMATED COUNT: 14.5 % (ref 10–15)
GFR SERPL CREATININE-BSD FRML MDRD: 82 ML/MIN/{1.73_M2}
GLUCOSE SERPL-MCNC: 78 MG/DL (ref 70–99)
HCT VFR BLD AUTO: 41.9 % (ref 40–53)
HGB BLD-MCNC: 13.7 G/DL (ref 13.3–17.7)
MCH RBC QN AUTO: 30.9 PG (ref 26.5–33)
MCHC RBC AUTO-ENTMCNC: 32.7 G/DL (ref 31.5–36.5)
MCV RBC AUTO: 95 FL (ref 78–100)
PLATELET # BLD AUTO: 211 10E9/L (ref 150–450)
POTASSIUM SERPL-SCNC: 3.9 MMOL/L (ref 3.4–5.3)
RBC # BLD AUTO: 4.43 10E12/L (ref 4.4–5.9)
SODIUM SERPL-SCNC: 143 MMOL/L (ref 133–144)
WBC # BLD AUTO: 12.3 10E9/L (ref 4–11)

## 2020-09-25 PROCEDURE — 97161 PT EVAL LOW COMPLEX 20 MIN: CPT | Mod: GP | Performed by: PHYSICAL THERAPIST

## 2020-09-25 PROCEDURE — 25000128 H RX IP 250 OP 636: Performed by: PHYSICIAN ASSISTANT

## 2020-09-25 PROCEDURE — 85027 COMPLETE CBC AUTOMATED: CPT | Performed by: HOSPITALIST

## 2020-09-25 PROCEDURE — 80048 BASIC METABOLIC PNL TOTAL CA: CPT | Performed by: HOSPITALIST

## 2020-09-25 PROCEDURE — 97530 THERAPEUTIC ACTIVITIES: CPT | Mod: GP | Performed by: PHYSICAL THERAPIST

## 2020-09-25 PROCEDURE — 25000132 ZZH RX MED GY IP 250 OP 250 PS 637: Mod: GY | Performed by: PHYSICIAN ASSISTANT

## 2020-09-25 PROCEDURE — 12000000 ZZH R&B MED SURG/OB

## 2020-09-25 PROCEDURE — 99232 SBSQ HOSP IP/OBS MODERATE 35: CPT | Performed by: HOSPITALIST

## 2020-09-25 PROCEDURE — 97116 GAIT TRAINING THERAPY: CPT | Mod: GP | Performed by: PHYSICAL THERAPIST

## 2020-09-25 PROCEDURE — 36415 COLL VENOUS BLD VENIPUNCTURE: CPT | Performed by: HOSPITALIST

## 2020-09-25 RX ADMIN — ACETAMINOPHEN 975 MG: 325 TABLET, FILM COATED ORAL at 21:55

## 2020-09-25 RX ADMIN — ACETAMINOPHEN 975 MG: 325 TABLET, FILM COATED ORAL at 13:06

## 2020-09-25 RX ADMIN — CEFAZOLIN SODIUM 2 G: 2 INJECTION, SOLUTION INTRAVENOUS at 08:25

## 2020-09-25 RX ADMIN — ASPIRIN 325 MG: 325 TABLET, DELAYED RELEASE ORAL at 08:25

## 2020-09-25 ASSESSMENT — ACTIVITIES OF DAILY LIVING (ADL)
ADLS_ACUITY_SCORE: 24
ADLS_ACUITY_SCORE: 24
ADLS_ACUITY_SCORE: 26
ADLS_ACUITY_SCORE: 26
ADLS_ACUITY_SCORE: 24
ADLS_ACUITY_SCORE: 26

## 2020-09-25 NOTE — PLAN OF CARE
Alert to self. [place]. VSS. Denies pain. CMS intact. Dressing CDI. Reg diet. Voiding in  urinal. DTD.

## 2020-09-25 NOTE — PROGRESS NOTES
Care Transitions Team: Following for CC, discharge planning, and disposition.       Per [facility] Liaison Handoff:   Writer spoke with daughter Medhat) via phone introduced myself and explained my role. She wants what is best for her dad and will follow therapy recommendations. Writer spoke with Mira irwin nurse at [facility] and they will be able to provide extra assistance as needed at no additional cost to family. They can take weekend admissions if needed the on-call nurse is Jo [phone].    Living situation:   Support: 24/7 staff in memory care.    Available transportation: Daughter Medhat) will provide.   Home environment: Locked  unit 2 aides to 16 patients.     Stairs-had rails? None    Equipment available : Daughter has a walker   Increase service or equipment needs:  None noted.    Prior Function level:   Ambulation Independent   ADL's Independent, except SBA with showers    Current home care services: None    Family/patient discharge goal: Return to previous level of function  Barriers to Discharge: Pain managed and medically stable   Discharge Plan of care: Return to  with [facility] or TCU if recommended.     TCU - Medicare compare TCU list provided - CM discussed Medicare compare website and star ratings. Backup plan if unable to discharge home:   1.  [facility]    Orders needed for services: Post Op Plan of Care - Home with [facility]  Weight bearing status and Hip precautions: WBAT  Transportation: Daughter Medhat) will provide.       Will follow in collaboration with [facility] CM  Solange Mancini -[phone] [facility] for discharge planning.

## 2020-09-25 NOTE — PROGRESS NOTES
[facility]  Hospitalist Progress Note        Quirino Collado MD   09/25/2020        Interval History:      - had surgery for femur neck fracture 9/24; patient confused at baseline and has no recollection of surgery     no acute issues overnight; planned for OR 9/24 for right femoral neck fracture         Assessment and Plan:        Raulito Iyer is a 89 year old male with PMH of dementia, TIA not on any active meds PTA who was sent from his memory care facility on 9/23 after staff noted acute weakness. Initial concern was for TIA due to conflicting reports of weakness (noted left sided weakness at TCU; in ED right weakness more on RLE which eventually was though to be due to right femoral neck fracture)    - Upon road test in the ED, the patient was quite slow to move, particularly with his right lower extremity.  An abrasion was noted on the lateral aspect of the right knee.  - XR pelvis shows suboptimal evaluation of right femoral neck, CT was obtained which showed acute right femoral neck fracture.    Acute right femoral neck fracture  S/p In-situ percutaneous screw fixation of right valgus impacted femoral neck fracture 9/24/20.   -- evaluated by orthopedics, and had ORIF 9/24  - PT eval pending post op; management per ortho  - pain seems adequately controlled  - SW for disposition     TIA  - patient had acute onset generalized weakness as noted by staff.  He also noticed acute left facial droop and left lower extremity weakness  - Initial concern was for TIA due to conflicting reports of weakness (noted left sided weakness at TCU; in ED right weakness more on RLE which eventually was though to be due to right femoral neck fracture)  - was seen as a Code stroke in ED; imagings UR showing patent arteries in the neck without evidence of dissection, patent proximal major intercranial arteries, unremarkable CT perfusion, and no evidence of acute intracranial hemorrhage, mass or  herniation  - neurology evaluated; suspect that difficulty ambulating was secondary to right femoral neck fracture rather than TIA vs stroke and suggested MRI brain only if new suspicion for focal deficets, otherwise would not pursue further TIA/stroke evaluation at this time  - marked leukocytosis on admission with wbc 19 likely a stress response, wbc--->12; UR UA, COVID negative; no clear infective source, no new focal neuro deficits     Dementia  - confused at baseline with significant short-term memory loss, is at baseline mentation  - At high risk for delirium, monitor closely    DVT Prophylaxis: Pneumatic Compression Devices    Code Status: DNR/I     Disposition Plan: likely 1-2 days pending PT eval and Ortho clearance; might need memory care TCU; SW following for disposition    Care plan discussed with daughter over the phone                   Physical Exam:      Blood pressure 105/62, pulse 58, temperature 97.8  F (36.6  C), temperature source Oral, resp. rate 12, height 1.829 m (6'), weight 82.3 kg (181 lb 7 oz), SpO2 92 %.  Vitals:    09/23/20 1259   Weight: 82.3 kg (181 lb 7 oz)     Vital Signs with Ranges  Temp:  [97.7  F (36.5  C)-98.8  F (37.1  C)] 97.8  F (36.6  C)  Pulse:  [46-61] 58  Resp:  [10-16] 12  BP: ()/(51-67) 105/62  SpO2:  [91 %-100 %] 92 %  I/O's Last 24 hours  I/O last 3 completed shifts:  In: 700 [I.V.:700]  Out: 175 [Urine:150; Blood:25]    Constitutional: Alert, awake and oriented to self only; resting comfortably in no apparent distress   HEENT: Pupils equal and reactive to light and accomodation, neck supple    Oral cavity: Moist mucosa   Cardiovascular: Normal s1 s2, regular rate and rhythm, no murmur   Lungs: B/l clear to auscultation, no wheezes or crepitations   Abdomen: Soft, nt, nd, no guarding, rigidity or rebound; BS +   LE : No edema   Musculoskeletal: Power 5/5 in all extremities except restricted right hip movement due to pain   Neuro: No focal neurological deficits  noted            Medications:          acetaminophen  975 mg Oral Q8H     aspirin  325 mg Oral Daily     ceFAZolin  2 g Intravenous Q8H     sodium chloride (PF)  3 mL Intracatheter Q8H     sodium chloride (PF)  3 mL Intracatheter Q8H     PRN Meds: [START ON 9/27/2020] acetaminophen, sore throat lozenge, bisacodyl, HYDROmorphone, lidocaine 4%, lidocaine (buffered or not buffered), melatonin, naloxone, naloxone, ondansetron **OR** ondansetron, oxyCODONE IR, polyethylene glycol, senna-docusate **OR** senna-docusate, sodium chloride (PF), sodium chloride (PF)         Data:      All new lab and imaging data was reviewed.   Recent Labs   Lab Test 09/25/20  0655 09/24/20  0830 09/23/20  1308 07/24/17  1605   WBC 12.3* 12.1* 19.3* 9.6   HGB 13.7 13.6 16.6 16.9   MCV 95 95 95 93    229 294 291   INR  --   --  1.23* 0.98      Recent Labs   Lab Test 09/25/20  0655 09/24/20  0830 09/23/20  1308    141 142   POTASSIUM 3.9 3.5 3.8   CHLORIDE 111* 111* 109   CO2 28 25 24   BUN 22 17 18   CR 0.73 0.83 0.85   ANIONGAP 4 5 9   SHERRIE 7.8* 7.8* 9.0   GLC 78 96 174*     Recent Labs   Lab Test 07/24/17  1605 08/27/12  0100   TROPI <0.015  The 99th percentile for upper reference range is 0.045 ug/L.  Troponin values in   the range of 0.045 - 0.120 ug/L may be associated with risks of adverse   clinical events.   0.017

## 2020-09-25 NOTE — PROGRESS NOTES
SW:    D: writer contacted by [facility]. Trauma [facility] had reached out. If patient is in need of TCU, [facility] will most likely have a bed. [facility].     P: Will continue to follow    AUGUSTA Weston    [facility]

## 2020-09-25 NOTE — PROGRESS NOTES
09/25/20 1030   Quick Adds   Type of Visit Initial PT Evaluation   Living Environment   Lives With facility resident   Living Arrangements extended care facility;other (see comments)  (memory care)   Home Accessibility no concerns   Transportation Anticipated family or friend will provide   Living Environment Comment patient lives in memory care per chart/daughter   Self-Care   Usual Activity Tolerance excellent   Current Activity Tolerance good   Regular Exercise Yes   Activity/Exercise Type walking   Exercise Amount/Frequency daily   Equipment Currently Used at Home none   Activity/Exercise/Self-Care Comment no use of an assistive device at [facility] per daughter report   Functional Level Prior   Ambulation 0-->independent   Transferring 0-->independent   Toileting 1-->assistive equipment   Bathing 1-->assistive equipment   Cognition 1 - attention or memory deficits   Fall history within last six months yes   Number of times patient has fallen within last six months 1   Which of the above functional risks had a recent onset or change? ambulation;transferring;toileting;bathing;dressing;fall history   Prior Functional Level Comment normally ambulatory without an assistive device   General Information   Onset of Illness/Injury or Date of Surgery - Date 09/23/20   Referring Physician Danna Ramirez PA-C    Patient/Family Goals Statement not stated   Pertinent History of Current Problem (include personal factors and/or comorbidities that impact the POC) per chart: Raulito Iyer is a 89 year old male who presents from his memory care facility with worsening confusion, weakness with ambulation and reported left facial droop.  Patient has a past medical history of TIA, dementia; found to have difficulty ambulating and found to have a Right valgus impacted femoral neck fracture; Patient underwent Percutaneous Screw Fixation for R Femoral Neck Fracture   Precautions/Limitations fall precautions   Weight-Bearing  Status - RLE weight-bearing as tolerated   General Observations patient in bed; doesn't know he is in the hospital; doesn't remember surgery   General Info Comments Activity: up with assist   Cognitive Status Examination   Orientation orientation to person, place and time   Level of Consciousness alert   Follows Commands and Answers Questions 75% of the time   Personal Safety and Judgment impaired   Memory impaired   Pain Assessment   Patient Currently in Pain No   Integumentary/Edema   Integumentary/Edema Comments R hip incision   Posture    Posture Comments WFL   Range of Motion (ROM)   ROM Comment R LE with mild limitation of active movement; others appear WFL   Strength   Strength Comments R LE limited by recent surgery and mild discomfort; others appear WFL   Bed Mobility   Bed Mobility Comments min A   Transfer Skills   Transfer Comments sit>stand with min A and walker   Gait   Gait Comments able to ambulate in room with min A and walker   Balance   Balance Comments mild unsteadiness; no gross LOB   Modality Interventions   Planned Modality Interventions Comments ice to R hip   General Therapy Interventions   Planned Therapy Interventions bed mobility training;gait training;transfer training;progressive activity/exercise   Clinical Impression   Criteria for Skilled Therapeutic Intervention yes, treatment indicated   PT Diagnosis impaired gait/transfers   Influenced by the following impairments mild functional weakness; mild R hip discomfort; impaired cognition   Functional limitations due to impairments impaired independence with functional mobility   Clinical Presentation Stable/Uncomplicated   Clinical Presentation Rationale clinical judgement; level of assist   Clinical Decision Making (Complexity) Low complexity   Therapy Frequency Daily   Predicted Duration of Therapy Intervention (days/wks) 3 days   Anticipated Equipment Needs at Discharge front wheeled walker   Anticipated Discharge Disposition  "Transitional Care Facility;Other (see comments)  (possibly back to memory care with Home PT 24/7 assist)   Risk & Benefits of therapy have been explained Yes   Patient, Family & other staff in agreement with plan of care Yes   [facility] TM \"6 Clicks\"   2016, Trustees of [employer], under license to [employer].  All rights reserved.   6 Clicks Short Forms Basic Mobility Inpatient Short Form   [employer] [facility] [employer] [facility] [employer] AM-PAC  \"6 Clicks\" V.2 Basic Mobility Inpatient Short Form   1. Turning from your back to your side while in a flat bed without using bedrails? 3 - A Little   2. Moving from lying on your back to sitting on the side of a flat bed without using bedrails? 3 - A Little   3. Moving to and from a bed to a chair (including a wheelchair)? 3 - A Little   4. Standing up from a chair using your arms (e.g., wheelchair, or bedside chair)? 3 - A Little   5. To walk in hospital room? 3 - A Little   6. Climbing 3-5 steps with a railing? 2 - A Lot   Basic Mobility Raw Score (Score out of 24.Lower scores equate to lower levels of function) 17   Total Evaluation Time   Total Evaluation Time (Minutes) 10     "

## 2020-09-25 NOTE — PLAN OF CARE
Alert to self only.Denies pain.Dreg to Rt hip CDI.Up with 1 assist/walker.Voiding well per urinal.Turned and repositioned at times,Incontinence at times too.On regular diet..Will continue to monitor.   <<----- Click to add NO significant Past Surgical History

## 2020-09-25 NOTE — PLAN OF CARE
Discharge Planner PT   Patient plan for discharge: not stated  Current status: PT: Order received; Initial evaluation completed and treatment initiated POD #1 s/p Percutaneous Screw Fixation of R Femoral Neck Fracture. Prior to admit chart indicates patient was living at [facility] care and was independent with ambulation. On eval patient was able to get to EOB with min/CGA and set up; sit>stand with min A and use of a walker; able to ambulate 50 feet with walker and CGA/safety cues; up in bedside wheelchair at end of session. Spoke with daughter about return to memory care vs. TCU. Daughter making calls to help make informed decision.  Barriers to return to prior living situation: need for 24/7 assist for all mobility and cares; falls risk; impaired cognition  Recommendations for discharge: TCU  Rationale for recommendations: Patient would benefit from TCU to maximize return of independence with all functional mobility; able to follow commands and has good participation. Daughter may have him return directly to memory care with assist as it is a more familiar environment; if patient returns to memory care he would need 24/7 assist for all mobility and cares and would benefit from Home PT.       Entered by: Minna Resendiz 09/25/2020 11:11 AM

## 2020-09-25 NOTE — PLAN OF CARE
Pt alert to self only, CMS intact, [place], reg diet, POD 1, dressing CDI, will continue to monitor

## 2020-09-25 NOTE — CONSULTS
Care Coordination:    Following for discharge planning.  See note from BERNADETTE Narvaez Care coordinator who is following this patient.  Spoke with Mira, Coordinator at facility [phone].  Reviewed therapy notes. They can provide assist but not 1:1 assist.    Pt lives at [facility]. Pt can return back to facility (even on Weekend)  Jo [phone] for weekend admissions.   Fax orders to:[phone]  Fill new meds at hospital and send with.  If HC is indicates.  Facility uses [facility].  They will arrange Homecare.  Need orders and F2F.  If TCU is needed referral has been sent to [facility].    CC/SW to follow for discharge plan- TCU vs Back to [facility].       Whitley Garcia RN BSN  Inpatient Care Coordination  [facility]  [phone]

## 2020-09-26 VITALS
HEIGHT: 72 IN | OXYGEN SATURATION: 92 % | WEIGHT: 181.44 LBS | DIASTOLIC BLOOD PRESSURE: 70 MMHG | BODY MASS INDEX: 24.58 KG/M2 | RESPIRATION RATE: 16 BRPM | HEART RATE: 52 BPM | SYSTOLIC BLOOD PRESSURE: 116 MMHG | TEMPERATURE: 97.8 F

## 2020-09-26 LAB
GLUCOSE SERPL-MCNC: 92 MG/DL (ref 70–99)
HGB BLD-MCNC: 14.2 G/DL (ref 13.3–17.7)

## 2020-09-26 PROCEDURE — 99238 HOSP IP/OBS DSCHRG MGMT 30/<: CPT | Performed by: HOSPITALIST

## 2020-09-26 PROCEDURE — 85018 HEMOGLOBIN: CPT | Performed by: PHYSICIAN ASSISTANT

## 2020-09-26 PROCEDURE — 25000132 ZZH RX MED GY IP 250 OP 250 PS 637: Mod: GY | Performed by: PHYSICIAN ASSISTANT

## 2020-09-26 PROCEDURE — 36415 COLL VENOUS BLD VENIPUNCTURE: CPT | Performed by: PHYSICIAN ASSISTANT

## 2020-09-26 PROCEDURE — 82947 ASSAY GLUCOSE BLOOD QUANT: CPT | Performed by: PHYSICIAN ASSISTANT

## 2020-09-26 RX ORDER — POLYETHYLENE GLYCOL 3350 17 G/17G
17 POWDER, FOR SOLUTION ORAL DAILY PRN
DISCHARGE
Start: 2020-09-26

## 2020-09-26 RX ORDER — ACETAMINOPHEN 325 MG/1
975 TABLET ORAL EVERY 8 HOURS PRN
DISCHARGE
Start: 2020-09-26 | End: 2020-09-28

## 2020-09-26 RX ORDER — AMOXICILLIN 250 MG
1 CAPSULE ORAL 2 TIMES DAILY PRN
Qty: 30 TABLET | Refills: 0 | Status: SHIPPED | OUTPATIENT
Start: 2020-09-26

## 2020-09-26 RX ORDER — ONDANSETRON 4 MG/1
4 TABLET, ORALLY DISINTEGRATING ORAL EVERY 6 HOURS PRN
Qty: 30 TABLET | Refills: 0 | Status: SHIPPED | OUTPATIENT
Start: 2020-09-26 | End: 2020-10-05

## 2020-09-26 RX ORDER — OXYCODONE HYDROCHLORIDE 5 MG/1
2.5-5 TABLET ORAL EVERY 4 HOURS PRN
Qty: 30 TABLET | Refills: 0 | Status: SHIPPED | OUTPATIENT
Start: 2020-09-26 | End: 2020-09-26

## 2020-09-26 RX ORDER — ASPIRIN 325 MG
325 TABLET, DELAYED RELEASE (ENTERIC COATED) ORAL DAILY
Qty: 30 TABLET | Refills: 0 | Status: SHIPPED | OUTPATIENT
Start: 2020-09-27

## 2020-09-26 RX ADMIN — ACETAMINOPHEN 975 MG: 325 TABLET, FILM COATED ORAL at 13:26

## 2020-09-26 RX ADMIN — ACETAMINOPHEN 975 MG: 325 TABLET, FILM COATED ORAL at 06:28

## 2020-09-26 RX ADMIN — ASPIRIN 325 MG: 325 TABLET, DELAYED RELEASE ORAL at 08:05

## 2020-09-26 ASSESSMENT — ACTIVITIES OF DAILY LIVING (ADL)
ADLS_ACUITY_SCORE: 24

## 2020-09-26 NOTE — PLAN OF CARE
"PT-  Pt refused PT at time of appt. Encouraged mobility but pt strongly declined.  Stated \"No PT right now. I'm going to snooze.\"  "

## 2020-09-26 NOTE — PROGRESS NOTES
Raulito Iyer  2020  Patient status post Right CRPP  Admit Date:  2020      Doing well.  Clean wound without signs of infection.  No immediate surgical complications identified.  No excessive bleeding  Pain well-controlled.  Objective: no chest pain or shortness of breath. Working slowly with therapy. Pain overall well controlled.   Blood pressure 116/70, pulse 52, temperature 97.8  F (36.6  C), temperature source Oral, resp. rate 16, height 1.829 m (6'), weight 82.3 kg (181 lb 7 oz), SpO2 92 %.    Temperatures:  Current - Temp: 97.8  F (36.6  C); Max - Temp  Av  F (36.7  C)  Min: 97.7  F (36.5  C)  Max: 98.3  F (36.8  C)  Pulse range: Pulse  Av.3  Min: 52  Max: 65  Blood pressure range: Systolic (24hrs), Av , Min:111 , Max:144   ; Diastolic (24hrs), Av, Min:63, Max:83    Exam:  CMS: intact  alert, stable, wound ok  Dressing c/d/i  Calf s/nt  DF/PF   Communicates clearly with examiner    Labs:  Recent Labs   Lab Test 20  0655 20  0830 20  1308   POTASSIUM 3.9 3.5 3.8     Recent Labs   Lab Test 20  0633 20  0655 20  0830   HGB 14.2 13.7 13.6     Recent Labs   Lab Test 20  1308 17  1605   INR 1.23* 0.98     Recent Labs   Lab Test 20  0655 20  0830 20  1308    229 294       PLAN: continue current therapy regimen. Aspirin for dvt ppx. WBAT in the RLE with walker.

## 2020-09-26 NOTE — PLAN OF CARE
Pt alert to self - pleasantly confused, VSS on RA, dressing cdi, cms intact, denies pain - has gotten scheduled tylenol today, up with 1, voiding adequately in BR, regular diet - tolerated dinner well. Discharge and follow up instructions given to patient and daughter. Daughter verbalized understanding of discharge instructions, all belongings returned to patient. Patient discharging to [facility] home on a stretcher.

## 2020-09-26 NOTE — CONSULTS
SW:  D:  Call placed to patient's daughter Maria Elena to discuss discharge plans.  Explained that we  Have a bed available at [facility] for today.  Maria Elena states that she has not spoken with [facility] as of yet to see if patient can return there and increase services.  Maria Elena states that patient is at the lowest level of care so he can add services.  Explained that he still will not have 24 hour care unless they pay a private pay homecare agency for additional hours.  Explained then he will have homecare therapy which would be 2-3 times a week.  Explained that if he went to a TCU he would get therapy 6-7 times a week.  Patient's daughter states she understands and states she will contact [facility] and return my call  Received a call back from patient's daughter.  Maria Elena states that she would like for patient to go to [facility] on discharge.  Patient will need stretcher transport as he is confused and needs closer monitoring in the back of the rig.  Call placed to [facility] to arrange for stretcher transport at 17:00 today.  Faxed the facesheet and PCS to [facility].  Updated patient's daughter as to the transport time and she is in agreement.  Call placed to update [facility] and faxed the orders and the PAS.      PAS-RR    D: Per DHS regulation, SW completed and submitted PAS-RR to [place] [employer] Direct Connect via the Senior LinkAge Line.  PAS-RR confirmation # is : [id number].    I: SW spoke with patient's daughter  and they are aware a PAS-RR has been submitted.  HARRIETT reviewed with patient's daughter that they may be contacted for a follow up appointment within 10 days of hospital discharge if their SNF stay is < 30 days.  Contact information for Senior LinkAge Line was also provided.    A: Patient's daughter verbalized understanding.    P: Further questions may be directed to [facility] LinkAge Line at #[phone], option #4 for PAS-RR staff.      TIAN Huber, LICSW  Lead Social  Worker  [phone]  [facility]

## 2020-09-26 NOTE — PLAN OF CARE
VSS, CMS intact. Neuro intact except baseline confusion. Alert and orient to person only. Up with SBA and walker, but impulsive. Pain well managed with scheduled tylenol. Hip dressing changed. Voiding per urinal/BR, incontinent at times. Discharge to [facility] at 1700 via stretcher.

## 2020-09-26 NOTE — DISCHARGE SUMMARY
[facility]  Hospitalist Discharge Summary      Date of Admission:  9/23/2020  Date of Discharge:  9/26/2020  Discharging Provider: Moissé Hernandez MD      Discharge Diagnoses     Acute right femoral neck fracture    S/p In-situ percutaneous screw fixation of right valgus impacted femoral neck fracture     Suspected TIA    See hospital course below for additional chronic medical conditions    Follow-ups Needed After Discharge   Follow-up Appointments     Follow Up and recommended labs and tests      Follow up with Nursing home physician.               Unresulted Labs Ordered in the Past 30 Days of this Admission     No orders found from 8/24/2020 to 9/24/2020.      These results will be followed up by none    Discharge Disposition   Discharged to TCU   Condition at discharge: Stable     Hospital Course   Raulito Iyer is a 89 year old male with PMH of dementia, TIA not on any active meds PTA who was sent from his memory care facility on 9/23 after staff noted acute weakness. Initial concern was for TIA due to conflicting reports of weakness (noted left sided weakness at TCU; in ED right weakness more on RLE which eventually was thought to be due to right femoral neck fracture)   - Upon road test in the ED, the patient was quite slow to move, particularly with his right lower extremity. An abrasion was noted on the lateral aspect of the right knee.  XR pelvis shows suboptimal evaluation of right femoral neck, CT was obtained which showed acute right femoral neck fracture.     Acute right femoral neck fracture  S/p In-situ percutaneous screw fixation of right valgus impacted femoral neck fracture 9/24/20.   - Evaluated by orthopedics, and had ORIF 9/24, doing well post op.  - PT eval pending post op; management per ortho  - pain seems adequately controlled  - discharged to TCU today.   -ASA for DVT prophylaxis per ortho.      Suspected TIA  Patient had acute onset generalized weakness as noted by  staff.  He also noticed acute left facial droop and left lower extremity weakness  - Initial concern was for TIA due to conflicting reports of weakness (noted left sided weakness at TCU; in ED right weakness more on RLE which eventually was though to be due to right femoral neck fracture)  - Was seen as a Code stroke in ED; imagings UR showing patent arteries in the neck without evidence of dissection, patent proximal major intercranial arteries, unremarkable CT perfusion, and no evidence of acute intracranial hemorrhage, mass or herniation.  - Neurology evaluated; suspect that difficulty ambulating was secondary to right femoral neck fracture rather than TIA vs stroke and suggested MRI brain only if new suspicion for focal deficets, otherwise would not pursue further TIA/stroke evaluation at this time  - marked leukocytosis on admission with wbc 19 likely a stress response, wbc--->12; UR UA, COVID negative; no clear infective source, no new focal neuro deficits.     Dementia  - confused at baseline with significant short-term memory loss, is at baseline mentation  - At high risk for delirium, monitor closely    Consultations This Hospital Stay   PHYSICAL THERAPY ADULT IP CONSULT  OCCUPATIONAL THERAPY ADULT IP CONSULT  ORTHOPEDIC SURGERY IP CONSULT  CARE COORDINATOR IP CONSULT  OCCUPATIONAL THERAPY ADULT IP CONSULT  PHYSICAL THERAPY ADULT IP CONSULT  PHYSICAL THERAPY ADULT IP CONSULT  OCCUPATIONAL THERAPY ADULT IP CONSULT    Code Status   No CPR- Do NOT Intubate    Time Spent on this Encounter   I, Moisés Hernandez MD, personally saw the patient today and spent less than or equal to 30 minutes discharging this patient.       Moisés Hernandez MD  [facility]  ______________________________________________________________________    Physical Exam   Vital Signs: Temp: 97.8  F (36.6  C) Temp src: Oral BP: 116/70 Pulse: 52   Resp: 16 SpO2: 92 % O2 Device: None (Room air)    Weight: 181 lbs 7.02  oz    General: Alert awake, oriented to self and knows he is in hospital, appears comfortable.  HEENT: PERRLA EOMI. Mucosa moist.   Lungs: Bilateral equal air entry. Clear to auscultation, normal work of breathing.   CVS: S1S2 regular, no tachycardia or murmur.   Abdomen: Soft, NT, ND. BS heard.  MSK: No edema or deformities. Dressing over the Rt hip dry  Neuro: AAOX2. CN 2-12 normal. Strength symmetrical.  Skin: No rash.          Primary Care Physician   Sunny Yun    Discharge Orders      General info for SNF    Length of Stay Estimate: Short Term Care: Estimated # of Days <30  Condition at Discharge: Stable  Level of care:skilled   Rehabilitation Potential: Fair  Admission H&P remains valid and up-to-date: Yes  Recent Chemotherapy: N/A  Use Nursing Home Standing Orders: Yes     Mantoux instructions    Give two-step Mantoux (PPD) Per Facility Policy Yes.     Reason for your hospital stay    Right femoral neck fracture, s/p ORiF     Follow Up and recommended labs and tests    Follow up with Nursing home physician.     Activity - Up with assistive device     Activity - Up with nursing assistance     No CPR- Do NOT Intubate     Physical Therapy Adult Consult    Evaluate and treat as clinically indicated.    Reason:  S/p ORIF for Rt femoral neck fracture     Occupational Therapy Adult Consult    Evaluate and treat as clinically indicated.    Reason:  S/p ORIF for Rt femoral neck fracture     Fall precautions     Advance Diet as Tolerated    Follow this diet upon discharge: Orders Placed This Encounter      Regular Diet Adult       Significant Results and Procedures   Most Recent 3 CBC's:  Recent Labs   Lab Test 09/26/20  0633 09/25/20  0655 09/24/20  0830 09/23/20  1308   WBC  --  12.3* 12.1* 19.3*   HGB 14.2 13.7 13.6 16.6   MCV  --  95 95 95   PLT  --  211 229 294     Most Recent 3 BMP's:  Recent Labs   Lab Test 09/26/20  0633 09/25/20  0655 09/24/20  0830 09/23/20  1308   NA  --  143 141 142   POTASSIUM  --   3.9 3.5 3.8   CHLORIDE  --  111* 111* 109   CO2  --  28 25 24   BUN  --  22 17 18   CR  --  0.73 0.83 0.85   ANIONGAP  --  4 5 9   SHERRIE  --  7.8* 7.8* 9.0   GLC 92 78 96 174*     Most Recent 2 LFT's:No lab results found.  Most Recent 3 INR's:  Recent Labs   Lab Test 09/23/20  1308 07/24/17  1605   INR 1.23* 0.98     Most Recent 6 Bacteria Isolates From Any Culture (See EPIC Reports for Culture Details):No lab results found.  Most Recent TSH and T4:No lab results found.  Most Recent 6 glucoses:  Recent Labs   Lab Test 09/26/20  0633 09/25/20  0655 09/24/20  0830 09/23/20  1308 07/24/17  1605 08/27/12  0100   GLC 92 78 96 174* 120* 111*     Most Recent Urinalysis:  Recent Labs   Lab Test 09/23/20  1356 07/24/17 2013   COLOR Yellow Yellow   APPEARANCE Clear Clear   URINEGLC Negative Negative   URINEBILI Negative Negative   URINEKETONE 40* Negative   SG 1.020 1.015   UBLD Moderate* Negative   URINEPH 6.0 7.0   PROTEIN 30* Negative   UROBILINOGEN  --  1.0   NITRITE Negative Negative   LEUKEST Moderate* Negative   RBCU 2  --    WBCU 3  --    ,   Results for orders placed or performed during the hospital encounter of 09/23/20   CT Head Perfusion w Contrast    Narrative    CT ANGIOGRAM OF THE HEAD AND NECK WITH CONTRAST  CT HEAD PERFUSION WITH CONTRAST September 23, 2020 1:24 PM     HISTORY: Code Stroke.    TECHNIQUE: CT angiography with an injection of 70mL Isovue-370  (accession [id number]), 50mL Isovue-370 (accession [id number]) IV with  scans through the head and neck. Images were transferred to a separate  3-D workstation where multiplanar reformations and 3-D images were  created. Estimates of carotid stenoses are made relative to the distal  internal carotid artery diameters except as noted. Radiation dose for  this scan was reduced using automated exposure control, adjustment of  the mA and/or kV according to patient size, or iterative  reconstruction technique.     Perfusion scans were performed with injection of  additional IV  contrast. These images were processed on a separate 3-D workstation.     COMPARISON: MRA 7/24/2017.     CT HEAD FINDINGS: No contrast enhancing lesions. CT perfusion images  of the head are unremarkable.     CT ANGIOGRAM HEAD FINDINGS: The major intracranial arteries including  the proximal branches of the anterior cerebral, middle cerebral, and  posterior cerebral arteries appear patent without vascular cutoff. No  aneurysm identified. Multifocal stenoses of the intracranial vessels  including moderate stenoses of the left P1 and P2 segments, moderate  stenosis of the mid right P2 segment, and multiple additional mild  intracranial stenoses. Venous circulation is unremarkable.     CT ANGIOGRAM NECK FINDINGS: Scattered atherosclerotic calcification in  the aortic arch without significant stenosis at the origins of the  great vessels.     Right carotid artery: The right common and internal carotid arteries  are patent. Mild atherosclerotic disease at the carotid bifurcation  and proximal internal carotid artery without significant stenosis by  NASCET criteria.     Left carotid artery: The left common and internal carotid arteries are  patent. Mild atherosclerotic disease at the carotid bifurcation and  proximal internal carotid artery without significant stenosis by  NASCET criteria.     Vertebral arteries: Vertebral arteries are patent without evidence of  dissection. No significant stenosis.     Other findings: Marked multilevel degenerative changes throughout the  spine.       Impression    IMPRESSION:   1. Patent arteries in the neck without evidence of dissection. Mild  atherosclerotic disease in the carotid arteries bilaterally without  significant stenosis by NASCET criteria.  2. Patent proximal major intracranial arteries without vascular  cutoff. Multifocal mild to moderate stenoses of the intracranial  vessels likely due to intracranial atherosclerotic disease. No  aneurysm identified.  3.  Unremarkable CT perfusion images of the head.     Results discussed with Michaelle Jarvis at 1:36 PM on 9/23/2020.     JESSICA AGUDELO MD   CT Head w/o Contrast    Narrative    CT SCAN OF THE HEAD WITHOUT CONTRAST   9/23/2020 1:20 PM     HISTORY: Code Stroke.    TECHNIQUE:  Axial images of the head and coronal reformations without  IV contrast material. Radiation dose for this scan was reduced using  automated exposure control, adjustment of the mA and/or kV according  to patient size, or iterative reconstruction technique.    COMPARISON: Brain MR 7/24/2017.    FINDINGS: No evidence of acute intracranial hemorrhage. No mass effect  or midline shift. No abnormal extra-axial fluid collection. Moderate  diffuse parenchymal volume loss. Nonspecific white matter changes  likely due to chronic microvascular ischemic disease. Ventricular size  is within normal limits without evidence of hydrocephalus.    The visualized portions of the sinuses and mastoids appear normal. The  bony calvarium and bones of the skull base appear intact.       Impression    IMPRESSION:     1. No evidence of acute intracranial hemorrhage, mass, or herniation.  2. Diffuse parenchymal volume loss and white matter changes likely due  to chronic microvascular ischemic disease.    JESSICA AGUDELO MD   CTA Head Neck with Contrast    Narrative    CT ANGIOGRAM OF THE HEAD AND NECK WITH CONTRAST  CT HEAD PERFUSION WITH CONTRAST September 23, 2020 1:24 PM     HISTORY: Code Stroke.    TECHNIQUE: CT angiography with an injection of 70mL Isovue-370  (accession [id number]), 50mL Isovue-370 (accession [id number]) IV with  scans through the head and neck. Images were transferred to a separate  3-D workstation where multiplanar reformations and 3-D images were  created. Estimates of carotid stenoses are made relative to the distal  internal carotid artery diameters except as noted. Radiation dose for  this scan was reduced using automated exposure control, adjustment  of  the mA and/or kV according to patient size, or iterative  reconstruction technique.     Perfusion scans were performed with injection of additional IV  contrast. These images were processed on a separate 3-D workstation.     COMPARISON: MRA 7/24/2017.     CT HEAD FINDINGS: No contrast enhancing lesions. CT perfusion images  of the head are unremarkable.     CT ANGIOGRAM HEAD FINDINGS: The major intracranial arteries including  the proximal branches of the anterior cerebral, middle cerebral, and  posterior cerebral arteries appear patent without vascular cutoff. No  aneurysm identified. Multifocal stenoses of the intracranial vessels  including moderate stenoses of the left P1 and P2 segments, moderate  stenosis of the mid right P2 segment, and multiple additional mild  intracranial stenoses. Venous circulation is unremarkable.     CT ANGIOGRAM NECK FINDINGS: Scattered atherosclerotic calcification in  the aortic arch without significant stenosis at the origins of the  great vessels.     Right carotid artery: The right common and internal carotid arteries  are patent. Mild atherosclerotic disease at the carotid bifurcation  and proximal internal carotid artery without significant stenosis by  NASCET criteria.     Left carotid artery: The left common and internal carotid arteries are  patent. Mild atherosclerotic disease at the carotid bifurcation and  proximal internal carotid artery without significant stenosis by  NASCET criteria.     Vertebral arteries: Vertebral arteries are patent without evidence of  dissection. No significant stenosis.     Other findings: Marked multilevel degenerative changes throughout the  spine.       Impression    IMPRESSION:   1. Patent arteries in the neck without evidence of dissection. Mild  atherosclerotic disease in the carotid arteries bilaterally without  significant stenosis by NASCET criteria.  2. Patent proximal major intracranial arteries without vascular  cutoff.  Multifocal mild to moderate stenoses of the intracranial  vessels likely due to intracranial atherosclerotic disease. No  aneurysm identified.  3. Unremarkable CT perfusion images of the head.     Results discussed with Michaelle Jarvis at 1:36 PM on 9/23/2020.     JESSICA AGUDELO MD   XR Chest 2 Views    Narrative    CHEST TWO VIEWS 9/23/2020 2:25 PM     HISTORY: Weakness.    COMPARISON: August 27, 2012       Impression    IMPRESSION:  There are no acute infiltrates. The cardiac silhouette is  not enlarged. Pulmonary vasculature is unremarkable.     FRANCISCA SANTOS MD   XR Pelvis 1/2 Views    Narrative    PELVIS ONE TO TWO VIEWS  9/23/2020 2:26 PM     HISTORY:  Right leg weakness, ? groin pain.    FINDINGS: Lower lumbar spine degenerative change. Urinary tract  contrast. There is impression on the inferior aspect of the bladder,  presumably from an enlarged prostate.      Impression    IMPRESSION: Due to projection, the right femoral neck is suboptimally  evaluated. If there is clinical concern for fracture, I would  recommend additional views (internal rotation and/or frog-leg). There  is mild right hip joint space narrowing.    NATALY GONZALES MD   CT Pelvis Bone wo Contrast    Narrative    CT PELVIS BONE WITHOUT CONTRAST9/23/2020 3:08 PM     HISTORY: Pain with ambulating.    TECHNIQUE: Axial images with reconstructions. No IV contrast.  Radiation dose for this scan was reduced using automated exposure  control, adjustment of the mA and/or kV according to patient size, or  iterative reconstruction technique.    COMPARISON: None    FINDINGS: Fracture of the subcapital portion of the right femoral  neck, with minimal displacement and impaction. Lower lumbar spine  degenerative change. Chondrocalcinosis of the hips and symphysis  pubis, consistent with calcium pyrophosphate deposition disease.  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 Urinary tract  contrast. Prostate enlargement with impression on the  bladder.  Osteopenia suspected. Bridging hyperostosis at the anterior aspects of  both sacroiliac joints.       Impression    IMPRESSION:  1. Right femoral neck fracture.  2. Additional findings discussed above.    NATALY GONZALES MD   XR Surgery KISHORE Fluoro L/T 5 Min    Narrative    CLINICAL HISTORY: ORIF of femoral neck fracture. Check hardware.    COMPARISON: None.    TECHNIQUE: 6 intraoperative spot images were obtained. 5 views. 75 seconds of fluoroscopy. There is cannulated lag screw fixation of the subcapital femoral neck fracture. Excellent position/alignment. No complication evident. Mild osteoarthrosis of the   hip joint.   XR Pelvis w Hip Right 1 View    Narrative    XR PELVIS AND RIGHT HIP ONE VIEW   9/24/2020 6:38 PM     HISTORY: Status post closed reduction and percutaneous pin ring (CRPP)  right femoral neck.    Comparison: Intraoperative spot views from today.      Impression    IMPRESSION: Cannulated lag screw fixation of the right femoral neck  fracture. Excellent position/alignment. No complication evident. Mild  osteoarthrosis in the hip joint.    FADUMO BETH MD       Discharge Medications   Current Discharge Medication List      START taking these medications    Details   acetaminophen (TYLENOL) 325 MG tablet Take 3 tablets (975 mg) by mouth every 8 hours as needed for mild pain    Associated Diagnoses: Closed fracture of neck of right femur, initial encounter (H)      aspirin (ASA) 325 MG EC tablet Take 1 tablet (325 mg) by mouth daily  Qty: 30 tablet, Refills: 0    Associated Diagnoses: Closed fracture of neck of right femur, initial encounter (H)      ondansetron (ZOFRAN-ODT) 4 MG ODT tab Take 1 tablet (4 mg) by mouth every 6 hours as needed for nausea or vomiting  Qty: 30 tablet, Refills: 0    Associated Diagnoses: Closed fracture of neck of right femur, initial encounter (H)      polyethylene glycol (MIRALAX) 17 g packet Take 17 g by mouth daily as needed for constipation  Qty:       Associated Diagnoses: Constipation, unspecified constipation type      senna-docusate (SENOKOT-S/PERICOLACE) 8.6-50 MG tablet Take 1 tablet by mouth 2 times daily as needed for constipation  Qty: 30 tablet, Refills: 0    Associated Diagnoses: Closed fracture of neck of right femur, initial encounter (H)         CONTINUE these medications which have NOT CHANGED    Details   NO ACTIVE MEDICATIONS             Allergies   No Known Allergies

## 2020-09-26 NOTE — PLAN OF CARE
A&O to self. VSS on RA, denies numbness or tingling. Neuro intact. Dressing CDI. Incontinent. Denies pain. Poor appetite. Encourage PO fluids. Will continue to monitor.

## 2020-09-27 ASSESSMENT — MIFFLIN-ST. JEOR: SCORE: 1505.87

## 2020-09-27 NOTE — PLAN OF CARE
Physical Therapy Discharge Summary    Reason for therapy discharge:    Discharged to transitional care facility.    Progress towards therapy goal(s). See goals on Care Plan in [facility] electronic health record for goal details.  Goals not met.  Barriers to achieving goals:   Pt not seen after initial evaluation, declined visit..    Therapy recommendation(s):    Continued therapy is recommended.  Rationale/Recommendations:  to maximize return of independence with functional mobility.

## 2020-09-27 NOTE — PROGRESS NOTES
HARRIETT  D: Faxed discharge orders to ([phone]), [facility], [facility]    TIAN Abdalla, [facility]  [phone]

## 2020-09-28 ENCOUNTER — PATIENT OUTREACH (OUTPATIENT)
Dept: CARE COORDINATION | Facility: CLINIC | Age: 85
End: 2020-09-28

## 2020-09-28 ENCOUNTER — NURSING HOME VISIT (OUTPATIENT)
Dept: GERIATRICS | Facility: CLINIC | Age: 85
End: 2020-09-28
Payer: MEDICARE

## 2020-09-28 VITALS
DIASTOLIC BLOOD PRESSURE: 83 MMHG | SYSTOLIC BLOOD PRESSURE: 141 MMHG | HEART RATE: 82 BPM | OXYGEN SATURATION: 95 % | HEIGHT: 70 IN | BODY MASS INDEX: 26.34 KG/M2 | TEMPERATURE: 97.1 F | RESPIRATION RATE: 17 BRPM | WEIGHT: 184 LBS

## 2020-09-28 DIAGNOSIS — G45.9 TIA (TRANSIENT ISCHEMIC ATTACK): ICD-10-CM

## 2020-09-28 DIAGNOSIS — S72.001A CLOSED FRACTURE OF NECK OF RIGHT FEMUR, INITIAL ENCOUNTER (H): ICD-10-CM

## 2020-09-28 DIAGNOSIS — F03.B0 MODERATE DEMENTIA WITHOUT BEHAVIORAL DISTURBANCE (H): ICD-10-CM

## 2020-09-28 DIAGNOSIS — S72.001D CLOSED DISPLACED FRACTURE OF NECK OF RIGHT FEMUR WITH ROUTINE HEALING: Primary | ICD-10-CM

## 2020-09-28 DIAGNOSIS — S72.009A FEMORAL NECK FRACTURE (H): Primary | ICD-10-CM

## 2020-09-28 DIAGNOSIS — K59.03 DRUG-INDUCED CONSTIPATION: ICD-10-CM

## 2020-09-28 PROCEDURE — 99309 SBSQ NF CARE MODERATE MDM 30: CPT | Performed by: NURSE PRACTITIONER

## 2020-09-28 RX ORDER — ACETAMINOPHEN 325 MG/1
975 TABLET ORAL EVERY 6 HOURS
Start: 2020-09-28 | End: 2020-10-05

## 2020-09-28 NOTE — LETTER
To:             Please give to facility    From:   Diana Porras RN, Care Coordinator   [facility] -Care Coordination  [facility]   E-mail [email or web address]   [phone]    Patient Name:  Raulito Iyer YOB: 1930   Admit date: 9/26/2020      *Information Needed:  Please contact me when the patient will discharge (or if they will move to long term care)- include the discharge date, disposition, and main diagnosis   - If the patient is discharged with home care services, please provide the name of the agency    Also- Please inform me if a care conference is being held.   Diana Porras RN, Care Coordinator   [facility] -Care Coordination  [facility]   E-mail [email or web address]   [phone]                              Thank you

## 2020-09-28 NOTE — PROGRESS NOTES
[place] [facility]  PRIMARY CARE PROVIDER AND CLINIC:  Sunny Yun MD, [address] / [place] [address]  Chief Complaint   Patient presents with     Hospital F/U     [facility] Medical Record Number:  [id number]  Place of Service where encounter took place:  [facility] (S) [[id number]]    Raulito Iyer  is a 89 year old  (10/23/1930), admitted to the above facility from  [facility]. Hospital stay 9/23/20 through 9/26/20..  Admitted to this facility for  rehab, medical management and nursing care.    HPI:    HPI information obtained from: facility chart records, facility staff, patient report and [facility] chart review.   Brief Summary of Hospital Course:   PMH of dementia and TIA not on AC admitted for weakness  Acute right femoral neck Fx: s/p ORIF 9/24  Suspected TIA: left facial droop and left LE weakness, Perfusion CT negative, neurology consulted, no new focal deficits, likely weakness from acute femoral neck Fx.   Dementia: confused at baseline, significant short term memory loss  Updates on Status Since Skilled nursing Admission: ON exam today patient is resting in bed reading the paper, denies pain to right hip, states he has some stiffness when he walks, denies fever, chills, cough ,congestion, SOB, N/V/D or constipation, states he is sleeping well at night.     CODE STATUS/ADVANCE DIRECTIVES DISCUSSION:   No CPR- Do NOT Intubate   Patient's living condition: lives alone  ALLERGIES: Patient has no known allergies.  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  PAST SURGICAL HISTORY:   has a past surgical history that includes no history of surgery.  FAMILY HISTORY: family history is not on file.  SOCIAL HISTORY:   reports that he has quit smoking. He has never used smokeless tobacco. He reports current alcohol use of about 3.0 - 6.0 standard drinks of alcohol per week. He reports  "that he does not use drugs.    Post Discharge Medication Reconciliation Status: discharge medications reconciled, continue medications without change    Current Outpatient Medications   Medication Sig Dispense Refill     acetaminophen (TYLENOL) 325 MG tablet Take 3 tablets (975 mg) by mouth every 8 hours as needed for mild pain       aspirin (ASA) 325 MG EC tablet Take 1 tablet (325 mg) by mouth daily 30 tablet 0     ondansetron (ZOFRAN-ODT) 4 MG ODT tab Take 1 tablet (4 mg) by mouth every 6 hours as needed for nausea or vomiting 30 tablet 0     polyethylene glycol (MIRALAX) 17 g packet Take 17 g by mouth daily as needed for constipation       senna-docusate (SENOKOT-S/PERICOLACE) 8.6-50 MG tablet Take 1 tablet by mouth 2 times daily as needed for constipation 30 tablet 0     NO ACTIVE MEDICATIONS           ROS:  10 point ROS of systems including Constitutional, Eyes, Respiratory, Cardiovascular, Gastroenterology, Genitourinary, Integumentary, Musculoskeletal, Psychiatric were all negative except for pertinent positives noted in my HPI.    Vitals:  BP (!) 141/83   Pulse 82   Temp 97.1  F (36.2  C)   Resp 17   Ht 1.778 m (5' 10\")   Wt 83.5 kg (184 lb)   SpO2 95%   BMI 26.40 kg/m    Exam:  GENERAL APPEARANCE:  Alert, in no distress  ENT:  Mouth and posterior oropharynx normal, moist mucous membranes, [place]  EYES:  EOM, conjunctivae, lids, pupils and irises normal, PERRL  NECK:  .  RESP:  no respiratory distress  CV:  no edema  ABDOMEN:  abdomen flat  M/S:   patient able to move all 4 extremities, no weakness noted  SKIN:  Inspection of skin and subcutaneous tissue baseline, did not visualize right hip incision  NEURO:   speech WNL  PSYCH:  affect and mood normal    Lab/Diagnostic data:    Most Recent 3 CBC's:  Recent Labs   Lab Test 09/26/20  0633 09/25/20  0655 09/24/20  0830 09/23/20  1308   WBC  --  12.3* 12.1* 19.3*   HGB 14.2 13.7 13.6 16.6   MCV  --  95 95 95   PLT  --  211 229 294     Most Recent 3 " BMP's:  Recent Labs   Lab Test 09/26/20  0633 09/25/20  0655 09/24/20  0830 09/23/20  1308   NA  --  143 141 142   POTASSIUM  --  3.9 3.5 3.8   CHLORIDE  --  111* 111* 109   CO2  --  28 25 24   BUN  --  22 17 18   CR  --  0.73 0.83 0.85   ANIONGAP  --  4 5 9   SHERRIE  --  7.8* 7.8* 9.0   GLC 92 78 96 174*       ASSESSMENT/PLAN:  Closed displaced fracture of neck of right femur with routine healing  Acute/ongoing: schedule tylenol 1000mg q 6 hours for pain  F/u with ortho as directed    TIA (transient ischemic attack)  Ongoing: continue ASA 325mg QD, monitor    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

## 2020-09-28 NOTE — LETTER
9/28/2020        RE: Raulito Iyer  [address] [place] [address]        [place] [facility]  PRIMARY CARE PROVIDER AND CLINIC:  Sunny Yun MD, [address] / [place] [address]  Chief Complaint   Patient presents with     Hospital F/U     [facility] Medical Record Number:  [id number]  Place of Service where encounter took place:  [facility] (S) [[id number]]    Raulito Iyer  is a 89 year old  (10/23/1930), admitted to the above facility from  [facility]. Hospital stay 9/23/20 through 9/26/20..  Admitted to this facility for  rehab, medical management and nursing care.    HPI:    HPI information obtained from: facility chart records, facility staff, patient report and [facility] chart review.   Brief Summary of Hospital Course:   PMH of dementia and TIA not on AC admitted for weakness  Acute right femoral neck Fx: s/p ORIF 9/24  Suspected TIA: left facial droop and left LE weakness, Perfusion CT negative, neurology consulted, no new focal deficits, likely weakness from acute femoral neck Fx.   Dementia: confused at baseline, significant short term memory loss  Updates on Status Since Skilled nursing Admission: ON exam today patient is resting in bed reading the paper, denies pain to right hip, states he has some stiffness when he walks, denies fever, chills, cough ,congestion, SOB, N/V/D or constipation, states he is sleeping well at night.     CODE STATUS/ADVANCE DIRECTIVES DISCUSSION:   No CPR- Do NOT Intubate   Patient's living condition: lives alone  ALLERGIES: Patient has no known allergies.  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  PAST SURGICAL HISTORY:   has a past surgical history that includes no history of surgery.  FAMILY HISTORY: family history is not on file.  SOCIAL HISTORY:   reports that he has quit smoking. He has never used smokeless tobacco. He reports  "current alcohol use of about 3.0 - 6.0 standard drinks of alcohol per week. He reports that he does not use drugs.    Post Discharge Medication Reconciliation Status: discharge medications reconciled, continue medications without change    Current Outpatient Medications   Medication Sig Dispense Refill     acetaminophen (TYLENOL) 325 MG tablet Take 3 tablets (975 mg) by mouth every 8 hours as needed for mild pain       aspirin (ASA) 325 MG EC tablet Take 1 tablet (325 mg) by mouth daily 30 tablet 0     ondansetron (ZOFRAN-ODT) 4 MG ODT tab Take 1 tablet (4 mg) by mouth every 6 hours as needed for nausea or vomiting 30 tablet 0     polyethylene glycol (MIRALAX) 17 g packet Take 17 g by mouth daily as needed for constipation       senna-docusate (SENOKOT-S/PERICOLACE) 8.6-50 MG tablet Take 1 tablet by mouth 2 times daily as needed for constipation 30 tablet 0     NO ACTIVE MEDICATIONS           ROS:  10 point ROS of systems including Constitutional, Eyes, Respiratory, Cardiovascular, Gastroenterology, Genitourinary, Integumentary, Musculoskeletal, Psychiatric were all negative except for pertinent positives noted in my HPI.    Vitals:  BP (!) 141/83   Pulse 82   Temp 97.1  F (36.2  C)   Resp 17   Ht 1.778 m (5' 10\")   Wt 83.5 kg (184 lb)   SpO2 95%   BMI 26.40 kg/m    Exam:  GENERAL APPEARANCE:  Alert, in no distress  ENT:  Mouth and posterior oropharynx normal, moist mucous membranes, [place]  EYES:  EOM, conjunctivae, lids, pupils and irises normal, PERRL  NECK:  .  RESP:  no respiratory distress  CV:  no edema  ABDOMEN:  abdomen flat  M/S:   patient able to move all 4 extremities, no weakness noted  SKIN:  Inspection of skin and subcutaneous tissue baseline, did not visualize right hip incision  NEURO:   speech WNL  PSYCH:  affect and mood normal    Lab/Diagnostic data:    Most Recent 3 CBC's:  Recent Labs   Lab Test 09/26/20  0633 09/25/20  0655 09/24/20  0830 09/23/20  1308   WBC  --  12.3* 12.1* 19.3*   HGB " 14.2 13.7 13.6 16.6   MCV  --  95 95 95   PLT  --  211 229 294     Most Recent 3 BMP's:  Recent Labs   Lab Test 09/26/20  0633 09/25/20  0655 09/24/20  0830 09/23/20  1308   NA  --  143 141 142   POTASSIUM  --  3.9 3.5 3.8   CHLORIDE  --  111* 111* 109   CO2  --  28 25 24   BUN  --  22 17 18   CR  --  0.73 0.83 0.85   ANIONGAP  --  4 5 9   SHERRIE  --  7.8* 7.8* 9.0   GLC 92 78 96 174*       ASSESSMENT/PLAN:  Closed displaced fracture of neck of right femur with routine healing  Acute/ongoing: schedule tylenol 1000mg q 6 hours for pain  F/u with ortho as directed    TIA (transient ischemic attack)  Ongoing: continue ASA 325mg QD, monitor    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                       Sincerely,        Tonya Lynn Haase, APRN CNP

## 2020-09-28 NOTE — PROGRESS NOTES
Clinic Care Coordination Contact  Care Coordination Transition Communication     Clinical Data:   [facility]  Hospitalist Discharge Summary       Date of Admission:  9/23/2020  Date of Discharge:  9/26/2020  Discharging Provider: Moisés Hernandez MD           Discharge Diagnoses       Acute right femoral neck fracture    S/p In-situ percutaneous screw fixation of right valgus impacted femoral neck fracture     Suspected TIA  Transition to Facility:              Facility Name:[facility]               Contact name and phone number/fax: CC faxed contact information to facility to call when the patient is discharged from TCU  Plan: RN/SW Care Coordinator will await notification from facility staff informing RN/SW Care Coordinator of patient's discharge plans/needs. RN/SW Care Coordinator will review chart and outreach to facility staff every 4 weeks and as needed.     [facility]     Diana Porras RN Care Coordinator   [facility] / [facility]   Phone: [phone]  Email :  [email or web address]

## 2020-09-29 ENCOUNTER — DOCUMENTATION ONLY (OUTPATIENT)
Dept: OTHER | Facility: CLINIC | Age: 85
End: 2020-09-29

## 2020-09-29 PROBLEM — Z71.89 ACP (ADVANCE CARE PLANNING): Chronic | Status: RESOLVED | Noted: 2017-04-27 | Resolved: 2020-09-29

## 2020-09-30 ENCOUNTER — TRANSFERRED RECORDS (OUTPATIENT)
Dept: HEALTH INFORMATION MANAGEMENT | Facility: CLINIC | Age: 85
End: 2020-09-30

## 2020-09-30 LAB
ANION GAP SERPL CALCULATED.3IONS-SCNC: 7 MMOL/L (ref 7–16)
BUN SERPL-MCNC: 10 MG/DL (ref 7–26)
CALCIUM SERPL-MCNC: 8.1 MG/DL (ref 8.4–10.4)
CHLORIDE SERPLBLD-SCNC: 109 MMOL/L (ref 98–109)
CO2 SERPL-SCNC: 27 MMOL/L (ref 20–29)
CREAT SERPL-MCNC: 0.61 MG/DL (ref 0.73–1.18)
GFR SERPL CREATININE-BSD FRML MDRD: >60 ML/MIN/1.73M2
GLUCOSE SERPL-MCNC: 88 MG/DL (ref 70–100)
HEMOGLOBIN: 13.2 G/DL (ref 13.5–17.5)
POTASSIUM SERPL-SCNC: 3.6 MMOL/L (ref 3.6–5.1)
SODIUM SERPL-SCNC: 143 MMOL/L (ref 136–145)

## 2020-10-02 ENCOUNTER — PATIENT OUTREACH (OUTPATIENT)
Dept: CARE COORDINATION | Facility: CLINIC | Age: 85
End: 2020-10-02

## 2020-10-02 NOTE — PROGRESS NOTES
Clinic Care Coordination Contact  Care Team Conversations    Incoming call from [facility] SW  Left a message on CC VM that the patient will be discharging back to memory care on Monday Oct 5.  Patient will have home care services due to femur fracture  CC will follow up when discharged from TCU    [facility]     Diana Porras  RN Care Coordinator   [facility] / [facility]   Phone: [phone]  Email :  [email or web address]

## 2020-10-05 ENCOUNTER — DISCHARGE SUMMARY NURSING HOME (OUTPATIENT)
Dept: GERIATRICS | Facility: CLINIC | Age: 85
End: 2020-10-05
Payer: MEDICARE

## 2020-10-05 VITALS
HEART RATE: 72 BPM | RESPIRATION RATE: 18 BRPM | TEMPERATURE: 98 F | SYSTOLIC BLOOD PRESSURE: 130 MMHG | OXYGEN SATURATION: 96 % | DIASTOLIC BLOOD PRESSURE: 70 MMHG

## 2020-10-05 DIAGNOSIS — S72.001D CLOSED DISPLACED FRACTURE OF NECK OF RIGHT FEMUR WITH ROUTINE HEALING: Primary | ICD-10-CM

## 2020-10-05 DIAGNOSIS — G45.9 TIA (TRANSIENT ISCHEMIC ATTACK): ICD-10-CM

## 2020-10-05 DIAGNOSIS — K59.03 DRUG-INDUCED CONSTIPATION: ICD-10-CM

## 2020-10-05 DIAGNOSIS — F03.B0 MODERATE DEMENTIA WITHOUT BEHAVIORAL DISTURBANCE (H): ICD-10-CM

## 2020-10-05 PROCEDURE — 99316 NF DSCHRG MGMT 30 MIN+: CPT | Performed by: NURSE PRACTITIONER

## 2020-10-05 RX ORDER — ACETAMINOPHEN 500 MG
1000 TABLET ORAL EVERY 6 HOURS PRN
COMMUNITY

## 2020-10-05 NOTE — PROGRESS NOTES
[place] [facility] DISCHARGE SUMMARY  PATIENT'S NAME: Raulito Iyer  YOB: 1930  MEDICAL RECORD NUMBER:  [id number]  Place of Service where encounter took place:  [facility] [[id number]]    PRIMARY CARE PROVIDER AND CLINIC RESPONSIBLE AFTER TRANSFER:   Sunny Yun MD, [address] / [place] [address]    [facility] Provider     Transferring providers: Tonya Lynn Haase, APRN CNP, Dr. Crispin Dallas MD  Recent Hospitalization/ED:  [facility] stay 9/23/20 to 9/26/20.   Date of SNF Admission: September / 26 / 2020  Date of SNF (anticipated) Discharge: October / 05 / 2020  Discharged to: previous independent home  Cognitive Scores: BIMS: 5/15 and SBT 22/28  Physical Function: Ambulating 300  ft with RWW  DME: [facility]    CODE STATUS/ADVANCE DIRECTIVES DISCUSSION:  DNR / DNI   ALLERGIES: Patient has no known allergies.    DISCHARGE DIAGNOSIS/NURSING FACILITY COURSE:   patient progressed to walking up to 300 feet using a RW and SBA, SBA with ADL's patient has severe cognitive impairement and will be [facility] with home PT through [place] .     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    Discharge Medications:    Current Outpatient Medications   Medication Sig Dispense Refill     acetaminophen (TYLENOL) 500 MG tablet Take 1,000 mg by mouth every 6 hours as needed for mild pain       aspirin (ASA) 325 MG EC tablet Take 1 tablet (325 mg) by mouth daily 30 tablet 0     ondansetron (ZOFRAN-ODT) 4 MG ODT tab Take 1  tablet (4 mg) by mouth every 6 hours as needed for nausea or vomiting 30 tablet 0     polyethylene glycol (MIRALAX) 17 g packet Take 17 g by mouth daily as needed for constipation       senna-docusate (SENOKOT-S/PERICOLACE) 8.6-50 MG tablet Take 1 tablet by mouth 2 times daily as needed for constipation 30 tablet 0     acetaminophen (TYLENOL) 325 MG tablet Take 3 tablets (975 mg) by mouth every 6 hours (Patient not taking: Reported on 10/5/2020)       NO ACTIVE MEDICATIONS           Medication Changes/Rationale:     There were no medication changes made    Controlled medications sent with patient:   not applicable/none     ROS:   10 point ROS of systems including Constitutional, Eyes, Respiratory, Cardiovascular, Gastroenterology, Genitourinary, Integumentary, Musculoskeletal, Psychiatric were all negative except for pertinent positives noted in my HPI.    Physical Exam:   Vitals: /70   Pulse 72   Temp 98  F (36.7  C)   Resp 18   SpO2 96%   BMI= There is no height or weight on file to calculate BMI.  GENERAL APPEARANCE:  Alert, in no distress  ENT:  Mouth and posterior oropharynx normal, moist mucous membranes, [place]  EYES:  EOM, conjunctivae, lids, pupils and irises normal, PERRL  NECK:  .  RESP:  no respiratory distress  CV:  peripheral edema 1+ in LE bilaterally  ABDOMEN:  abdomen round  M/S:   patient sitting on edge of bed, able to move all 4 extremities  SKIN:  Inspection of skin and subcutaneous tissue baseline  NEURO:   speech WNL  PSYCH:  affect and mood normal     SNF labs:   Most Recent 3 CBC's:  Recent Labs   Lab Test 09/30/20 09/26/20  0633 09/25/20  0655 09/24/20  0830 09/23/20  1308   WBC  --   --  12.3* 12.1* 19.3*   HGB 13.2* 14.2 13.7 13.6 16.6   MCV  --   --  95 95 95   PLT  --   --  211 229 294     Most Recent 3 BMP's:  Recent Labs   Lab Test 09/30/20 09/26/20  0633 09/25/20  0655 09/24/20  0830     --  143 141   POTASSIUM 3.6  --  3.9 3.5   CHLORIDE 109  --  111* 111*   CO2 27  --   28 25   BUN 10  --  22 17   CR 0.61*  --  0.73 0.83   ANIONGAP 7  --  4 5   SHERRIE 8.1*  --  7.8* 7.8*   GLC 88 92 78 96     ASSESSMENT/PLAN:  Closed displaced fracture of neck of right femur with routine healing  Acute/ongoing: continue tylenol 1000mg q 6 hours for pain  F/u with ortho as directed     TIA (transient ischemic attack)  Ongoing: continue ASA 325mg QD     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 behavioral disturbance (H)  Ongoing: DC back to Memory Care with home PT     Constipation  Drug induced  Acute: senna s 1 PO BID prn, miralax 17gm QD prn    DISCHARGE PLAN:    Follow up labs: No labs orders/due    Medical Follow Up:      Follow up with primary care provider in 1 weeks    MTM referral needed and placed by this provider: No    Current [place] scheduled appointments:     Future Appointments   Date Time Provider Department Center   10/9/2020  3:30 PM Sunny Yun MD [facility]         Discharge Services: Home Care:  Physical Therapy    Discharge Instructions Verbalized to Patient at Discharge:     None        Documentation of Face-to-Face and Certification for Home Health Services     Patient: Raulito Iyer   YOB: 1930  MR Number: [id number]  Today's Date: 10/5/2020    I certify that patient: Raulito Iyer is under my care and that I, or a nurse practitioner or physician's assistant working with me, had a face-to-face encounter that meets the physician face-to-face encounter requirements with this patient on: 10/5/2020.    This encounter with the patient was in whole, or in part, for the following medical condition, which is the primary reason for home health care: right hip Fx.    I certify that, based on my findings, the following services are medically necessary home health services: Physical Therapy.    My clinical findings support the need for the above services because: Physical Therapy Services are needed to assess and treat the following functional impairments: strengthening,  endurance, home safety.    Further, I certify that my clinical findings support that this patient is homebound (i.e. absences from home require considerable and taxing effort and are for medical reasons or [facility] services or infrequently or of short duration when for other reasons) because: Requires assistance of another person or specialized equipment to access medical services because patient: Requires supervision of another for safe transfer...    Based on the above findings. I certify that this patient is confined to the home and needs intermittent skilled nursing care, physical therapy and/or speech therapy.  The patient is under my care, and I have initiated the establishment of the plan of care.  This patient will be followed by a physician who will periodically review the plan of care.  Physician/Provider to provide follow up care: Sunny Yun    Responsible Medicare certified [place] Physician: Dr.Dan Celena MD, signing F2F and only signing for initial order. Please send all follow up questions and concerns or needed follow up signatures to the PCP, who [place] [facility] has on file as:  Sunny Yun.    Physician Signature: See electronic signature associated with these discharge orders.  Date: 10/5/2020    TOTAL DISCHARGE TIME:   Greater than 30 minutes  Electronically signed by:  Tonya Lynn Haase, APRN CNP     Home care Face to Face documentation done in EPIC attached to Home care orders for [facility].

## 2020-10-05 NOTE — LETTER
10/5/2020        RE: Raulito Iyer  [address] [place] [address]        [place] [facility] DISCHARGE SUMMARY  PATIENT'S NAME: Raulito Iyer  YOB: 1930  MEDICAL RECORD NUMBER:  [id number]  Place of Service where encounter took place:  [facility] [[id number]]    PRIMARY CARE PROVIDER AND CLINIC RESPONSIBLE AFTER TRANSFER:   Sunny Yun MD, [address] / [place] [address]    G Provider     Transferring providers: Tonya Lynn Haase, APRN CNP, Dr. Crispin Dallas MD  Recent Hospitalization/ED:  [facility] stay 9/23/20 to 9/26/20.   Date of SNF Admission: September / 26 / 2020  Date of SNF (anticipated) Discharge: October / 05 / 2020  Discharged to: previous independent home  Cognitive Scores: BIMS: 5/15 and SBT 22/28  Physical Function: Ambulating 300  ft with RWW  DME: Walker    CODE STATUS/ADVANCE DIRECTIVES DISCUSSION:  DNR / DNI   ALLERGIES: Patient has no known allergies.    DISCHARGE DIAGNOSIS/NURSING FACILITY COURSE:   patient progressed to walking up to 300 feet using a RW and SBA, SBA with ADL's patient has severe cognitive impairement and will be returning to [facility] with home PT through [facility].     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    Discharge Medications:    Current Outpatient Medications   Medication Sig Dispense Refill     acetaminophen (TYLENOL) 500 MG tablet Take 1,000 mg by mouth every 6 hours as needed for mild pain       aspirin (ASA) 325 MG EC tablet Take 1 tablet  (325 mg) by mouth daily 30 tablet 0     ondansetron (ZOFRAN-ODT) 4 MG ODT tab Take 1 tablet (4 mg) by mouth every 6 hours as needed for nausea or vomiting 30 tablet 0     polyethylene glycol (MIRALAX) 17 g packet Take 17 g by mouth daily as needed for constipation       senna-docusate (SENOKOT-S/PERICOLACE) 8.6-50 MG tablet Take 1 tablet by mouth 2 times daily as needed for constipation 30 tablet 0     acetaminophen (TYLENOL) 325 MG tablet Take 3 tablets (975 mg) by mouth every 6 hours (Patient not taking: Reported on 10/5/2020)       NO ACTIVE MEDICATIONS           Medication Changes/Rationale:     There were no medication changes made    Controlled medications sent with patient:   not applicable/none     ROS:   10 point ROS of systems including Constitutional, Eyes, Respiratory, Cardiovascular, Gastroenterology, Genitourinary, Integumentary, Musculoskeletal, Psychiatric were all negative except for pertinent positives noted in my HPI.    Physical Exam:   Vitals: /70   Pulse 72   Temp 98  F (36.7  C)   Resp 18   SpO2 96%   BMI= There is no height or weight on file to calculate BMI.  GENERAL APPEARANCE:  Alert, in no distress  ENT:  Mouth and posterior oropharynx normal, moist mucous membranes, [place]  EYES:  EOM, conjunctivae, lids, pupils and irises normal, PERRL  NECK:  .  RESP:  no respiratory distress  CV:  peripheral edema 1+ in LE bilaterally  ABDOMEN:  abdomen round  M/S:   patient sitting on edge of bed, able to move all 4 extremities  SKIN:  Inspection of skin and subcutaneous tissue baseline  NEURO:   speech WNL  PSYCH:  affect and mood normal     SNF labs:   Most Recent 3 CBC's:  Recent Labs   Lab Test 09/30/20 09/26/20  0633 09/25/20  0655 09/24/20  0830 09/23/20  1308   WBC  --   --  12.3* 12.1* 19.3*   HGB 13.2* 14.2 13.7 13.6 16.6   MCV  --   --  95 95 95   PLT  --   --  211 229 294     Most Recent 3 BMP's:  Recent Labs   Lab Test 09/30/20 09/26/20  0633 09/25/20  0655 09/24/20  0830      --  143 141   POTASSIUM 3.6  --  3.9 3.5   CHLORIDE 109  --  111* 111*   CO2 27  --  28 25   BUN 10  --  22 17   CR 0.61*  --  0.73 0.83   ANIONGAP 7  --  4 5   SHERRIE 8.1*  --  7.8* 7.8*   GLC 88 92 78 96     ASSESSMENT/PLAN:  Closed displaced fracture of neck of right femur with routine healing  Acute/ongoing: continue tylenol 1000mg q 6 hours for pain  F/u with ortho as directed     TIA (transient ischemic attack)  Ongoing: continue ASA 325mg QD     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 behavioral disturbance (H)  Ongoing: DC back to Memory Care with home PT     Constipation  Drug induced  Acute: senna s 1 PO BID prn, miralax 17gm QD prn    DISCHARGE PLAN:    Follow up labs: No labs orders/due    Medical Follow Up:      Follow up with primary care provider in 1 weeks    MTM referral needed and placed by this provider: No    Current [place] scheduled appointments:     Future Appointments   Date Time Provider Department Center   10/9/2020  3:30 PM Sunny Yun MD [facility]         Discharge Services: Home Care:  Physical Therapy    Discharge Instructions Verbalized to Patient at Discharge:     None        Documentation of Face-to-Face and Certification for Home Health Services     Patient: Raulito Iyer   YOB: 1930  MR Number: [id number]  Today's Date: 10/5/2020    I certify that patient: Raulito Iyer is under my care and that I, or a nurse practitioner or physician's assistant working with me, had a face-to-face encounter that meets the physician face-to-face encounter requirements with this patient on: 10/5/2020.    This encounter with the patient was in whole, or in part, for the following medical condition, which is the primary reason for home health care: right hip Fx.    I certify that, based on my findings, the following services are medically necessary home health services: Physical Therapy.    My clinical findings support the need for the above services because: Physical Therapy Services are  needed to assess and treat the following functional impairments: strengthening, endurance, home safety.    Further, I certify that my clinical findings support that this patient is homebound (i.e. absences from home require considerable and taxing effort and are for medical reasons or Hinduism services or infrequently or of short duration when for other reasons) because: Requires assistance of another person or specialized equipment to access medical services because patient: Requires supervision of another for safe transfer...    Based on the above findings. I certify that this patient is confined to the home and needs intermittent skilled nursing care, physical therapy and/or speech therapy.  The patient is under my care, and I have initiated the establishment of the plan of care.  This patient will be followed by a physician who will periodically review the plan of care.  Physician/Provider to provide follow up care: Sunny Yun    Responsible Medicare certified [place] Physician: Dr.Dan Celena MD, signing F2F and only signing for initial order. Please send all follow up questions and concerns or needed follow up signatures to the PCP, who [place] has on file as:  Sunny Yun.    Physician Signature: See electronic signature associated with these discharge orders.  Date: 10/5/2020    TOTAL DISCHARGE TIME:   Greater than 30 minutes  Electronically signed by:  Tonya Lynn Haase, APRN CNP     Home care Face to Face documentation done in EPIC attached to Home care orders for [facility].                     Sincerely,        Tonya Lynn Haase, APRN CNP

## 2020-10-06 ENCOUNTER — PATIENT OUTREACH (OUTPATIENT)
Dept: NURSING | Facility: CLINIC | Age: 85
End: 2020-10-06
Payer: MEDICARE

## 2020-10-06 NOTE — PROGRESS NOTES
Clinic Care Coordination Contact    Follow Up Progress Note   [facility]  Hospitalist Discharge Summary       Date of Admission:  9/23/2020  Date of Discharge:  9/26/2020  Discharging Provider: Moisés Hernandez MD           Discharge Diagnoses       Acute right femoral neck fracture    S/p In-situ percutaneous screw fixation of right valgus impacted femoral neck fracture     Suspected TIA    [facility] TCU discharge yesterday     Assessment: CC spoke to Maria Elena/ Daughter and she reports the patient is ding well walking around with a walker/  Patient resides at [facility] and is followed buy a [facility] provider        Plan:   Patient is followed by memory care provider   Not appropriate for clinic care coordination   [facility]     Diana Porras RN Care Coordinator   [facility] / [facility]   Phone: [phone]  Email :  [email or web address]

## 2020-12-25 ENCOUNTER — RECORDS - HEALTHEAST (OUTPATIENT)
Dept: LAB | Facility: CLINIC | Age: 85
End: 2020-12-25

## 2020-12-29 LAB
ANION GAP SERPL CALCULATED.3IONS-SCNC: 10 MMOL/L (ref 5–18)
BUN SERPL-MCNC: 12 MG/DL (ref 8–28)
CALCIUM SERPL-MCNC: 9 MG/DL (ref 8.5–10.5)
CHLORIDE BLD-SCNC: 104 MMOL/L (ref 98–107)
CO2 SERPL-SCNC: 26 MMOL/L (ref 22–31)
CREAT SERPL-MCNC: 0.68 MG/DL (ref 0.7–1.3)
ERYTHROCYTE [DISTWIDTH] IN BLOOD BY AUTOMATED COUNT: 13.5 % (ref 11–14.5)
GFR SERPL CREATININE-BSD FRML MDRD: >60 ML/MIN/1.73M2
GLUCOSE BLD-MCNC: 70 MG/DL (ref 70–125)
HCT VFR BLD AUTO: 48.5 % (ref 40–54)
HGB BLD-MCNC: 15.6 G/DL (ref 14–18)
MCH RBC QN AUTO: 31.3 PG (ref 27–34)
MCHC RBC AUTO-ENTMCNC: 32.2 G/DL (ref 32–36)
MCV RBC AUTO: 97 FL (ref 80–100)
PLATELET # BLD AUTO: 372 THOU/UL (ref 140–440)
PMV BLD AUTO: 10.1 FL (ref 8.5–12.5)
POTASSIUM BLD-SCNC: 3.8 MMOL/L (ref 3.5–5)
RBC # BLD AUTO: 4.99 MILL/UL (ref 4.4–6.2)
SODIUM SERPL-SCNC: 140 MMOL/L (ref 136–145)
WBC: 9.1 THOU/UL (ref 4–11)

## 2020-12-30 LAB — 25(OH)D3 SERPL-MCNC: 33.3 NG/ML (ref 30–80)

## 2021-10-23 ENCOUNTER — HOSPITAL ENCOUNTER (EMERGENCY)
Facility: CLINIC | Age: 86
Discharge: HOME | End: 2021-10-23
Attending: EMERGENCY MEDICINE | Admitting: EMERGENCY MEDICINE
Payer: MEDICARE

## 2021-10-23 ENCOUNTER — OFFICE VISIT (OUTPATIENT)
Dept: URGENT CARE | Facility: URGENT CARE | Age: 86
End: 2021-10-23
Payer: MEDICARE

## 2021-10-23 VITALS
SYSTOLIC BLOOD PRESSURE: 138 MMHG | HEART RATE: 71 BPM | OXYGEN SATURATION: 98 % | BODY MASS INDEX: 23.62 KG/M2 | TEMPERATURE: 97.5 F | DIASTOLIC BLOOD PRESSURE: 76 MMHG | RESPIRATION RATE: 16 BRPM | WEIGHT: 165 LBS | HEIGHT: 70 IN

## 2021-10-23 VITALS
HEART RATE: 78 BPM | OXYGEN SATURATION: 98 % | SYSTOLIC BLOOD PRESSURE: 138 MMHG | DIASTOLIC BLOOD PRESSURE: 70 MMHG | TEMPERATURE: 97.5 F

## 2021-10-23 DIAGNOSIS — S61.412A LACERATION OF LEFT HAND WITHOUT FOREIGN BODY, INITIAL ENCOUNTER: Primary | ICD-10-CM

## 2021-10-23 DIAGNOSIS — S61.412A LACERATION OF LEFT HAND WITHOUT FOREIGN BODY, INITIAL ENCOUNTER: ICD-10-CM

## 2021-10-23 PROCEDURE — 12002 RPR S/N/AX/GEN/TRNK2.6-7.5CM: CPT

## 2021-10-23 PROCEDURE — 99283 EMERGENCY DEPT VISIT LOW MDM: CPT

## 2021-10-23 ASSESSMENT — ENCOUNTER SYMPTOMS: WOUND: 1

## 2021-10-23 ASSESSMENT — MIFFLIN-ST. JEOR: SCORE: 1409.69

## 2021-10-23 ASSESSMENT — PAIN SCALES - GENERAL: PAINLEVEL: NO PAIN (0)

## 2021-10-23 NOTE — PROGRESS NOTES
THIS IS A TRIAGE ENCOUNTER.      Chief Complaint   Patient presents with     Urgent Care     Laceration     left hand split this am and has a cut.      Raulito Iyer is a 89 year old left-handed male who presents to the clinic with a split-open, 8 cm deep laceration sustained this morning at the [facility].      Given the length of the laceration and the depth of the laceration, patient will go to the emergency room for further treatment and evaluation.  I told the patient's relative that I would not charge for this brief triage evaluation.      Edmond Slaughter MD

## 2021-10-23 NOTE — DISCHARGE INSTRUCTIONS
Ice, elevate, keep clean.  Okay to take a shower.  Stitches out in 10 days.  Recheck sooner if any signs of infection.  Change the dressing in a couple of days and then daily and as needed.

## 2021-10-23 NOTE — ED PROVIDER NOTES
"  History     Chief Complaint:  Laceration      The history is provided by a relative. The history is limited by the condition of the patient.      Raulito Iyer is a 89 year old male who has a history of dementia and presents with laceration. The patient does not remember what occurred, but an assistant stated that he fell against the bathroom counter and split open his left hand this morning. He does not take any blood thinners.  Denies any head injury, is able to ambulate without any pain.        Review of Systems   Unable to perform ROS: Dementia   Skin: Positive for wound.         Allergies:  No Known Allergies      Medications:    aspirin   polyethylene glycol   senna-docusate         Past Medical History:    Abrasion, right knee  Fracture of femoral neck  Dementia  TIA  Cerebral infarction    Past Surgical History:    ORIF hip nailing    Social History:  Patient presents to the ED with son  Patient lives in memory care    Physical Exam     Patient Vitals for the past 24 hrs:   BP Temp Temp src Pulse Resp SpO2 Height Weight   10/23/21 1226 138/76 97.5  F (36.4  C) Temporal 71 16 98 % 1.778 m (5' 10\") 74.8 kg (165 lb)       Physical Exam  Musculoskeletal:        Hands:        Nursing note and vitals reviewed.    Constitutional:  Appears comfortable.    Cardiovascular:  Normal rate, regular rhythm.     Radial pulse 2+.  Cap refill less than 2 seconds.  Musculoskeletal:  Range of motion of the left hand and fingers and wrist is normal. No extremity deformity.     There is mild tenderness over the dorsum of the left hand where there is a laceration, 6.6 cm exposing a vein.  No cyanosis.   Neurological:   Alert and oriented. Exhibits good muscle tone.      Sensation in the hand and fingers is normal.     GCS eye subscore is 4. GCS verbal subscore is 5.      GCS motor subscore is 6.   Skin:    Skin is warm and dry.  Ecchymosis to the back of the left hand.  There is a 6.6 cm laceration that runs along the dorsum " of the hand across the fourth and fifth metacarpals.  Psychiatric:   Behavior is normal. Appropriate mood and affect.      Emergency Department Course     Procedures:    Narrative: Procedure: Laceration Repair        LACERATION:  A simple clean 6.6 cm laceration.      LOCATION:  Left hand      ANESTHESIA:  LET - Topical      PREPARATION:  Irrigation and Scrubbing with Techni-Care and Shur Clens      DEBRIDEMENT:  no debridement      CLOSURE:  Wound was closed with One Layer.  Skin closed with 9 x 5.0 Ethylon using interrupted sutures.      Emergency Department Course:    Reviewed:  I reviewed nursing notes, vitals and past history    Assessments:  1309 I obtained history and examined the patient as noted above.   1410 I rechecked the patient and explained findings.     Disposition:  The patient was discharged to home.    Impression & Plan      Medical Decision Making:  Patient comes in with a hand laceration.  There are no other injury present.  Because there was exposure of the vessel on the back of the hand, I did close it with a 5-0 Ethilon suture.  There was no evidence of any deeper injury such as a tendon or muscle injury.  He tolerated it well and a dressing was applied.  Son states his last tetanus was in the last couple of years.  They will have the stitches removed in about 10 days.      Ice, elevate, keep clean.  Okay to take a shower.  Stitches out in 10 days.  Recheck sooner if any signs of infection.  Change the dressing in a couple of days and then daily and as needed.    Diagnosis:    ICD-10-CM    1. Laceration of left hand without foreign body, initial encounter  S61.412A        Discharge Medications:  Discharge Medication List as of 10/23/2021  2:46 PM            Scribe Disclosure:  Samy ROSS Hired, am serving as a scribe at 1:08 PM on 10/23/2021 to document services personally performed by Nicki Rosales MD based on my observations and the provider's statements to me.      Nicki Rosales  MD [place]  10/23/21 4903

## 2022-01-26 ENCOUNTER — APPOINTMENT (OUTPATIENT)
Dept: CT IMAGING | Facility: CLINIC | Age: 87
End: 2022-01-26
Attending: EMERGENCY MEDICINE
Payer: MEDICARE

## 2022-01-26 ENCOUNTER — HOSPITAL ENCOUNTER (EMERGENCY)
Facility: CLINIC | Age: 87
Discharge: HOME | End: 2022-01-26
Attending: EMERGENCY MEDICINE | Admitting: EMERGENCY MEDICINE
Payer: MEDICARE

## 2022-01-26 ENCOUNTER — NURSE TRIAGE (OUTPATIENT)
Dept: NURSING | Facility: CLINIC | Age: 87
End: 2022-01-26
Payer: MEDICARE

## 2022-01-26 VITALS
OXYGEN SATURATION: 99 % | HEART RATE: 88 BPM | TEMPERATURE: 98.3 F | RESPIRATION RATE: 18 BRPM | DIASTOLIC BLOOD PRESSURE: 90 MMHG | SYSTOLIC BLOOD PRESSURE: 138 MMHG

## 2022-01-26 DIAGNOSIS — S61.412A LACERATION OF LEFT HAND, FOREIGN BODY PRESENCE UNSPECIFIED, INITIAL ENCOUNTER: ICD-10-CM

## 2022-01-26 DIAGNOSIS — S01.21XA LACERATION OF NOSE, INITIAL ENCOUNTER: ICD-10-CM

## 2022-01-26 DIAGNOSIS — S01.81XA LACERATION OF FOREHEAD, INITIAL ENCOUNTER: ICD-10-CM
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
## 2022-01-26 PROCEDURE — G1004 CDSM NDSC: HCPCS

## 2022-01-26 PROCEDURE — 99284 EMERGENCY DEPT VISIT MOD MDM: CPT | Mod: 25

## 2022-01-26 PROCEDURE — 12013 RPR F/E/E/N/L/M 2.6-5.0 CM: CPT

## 2022-01-26 PROCEDURE — 250N000009 HC RX 250

## 2022-01-26 PROCEDURE — 12044 INTMD RPR N-HF/GENIT7.6-12.5: CPT

## 2022-01-26 RX ORDER — LIDOCAINE HYDROCHLORIDE AND EPINEPHRINE 10; 10 MG/ML; UG/ML
INJECTION, SOLUTION INFILTRATION; PERINEURAL
Status: DISCONTINUED
Start: 2022-01-26 | End: 2022-01-26 | Stop reason: HOSPADM

## 2022-01-26 ASSESSMENT — ENCOUNTER SYMPTOMS
LIGHT-HEADEDNESS: 0
WOUND: 1
DIZZINESS: 0

## 2022-01-26 NOTE — TELEPHONE ENCOUNTER
RN triage   Call from pt son Cleve ---   Son is not with pt -- no signed consent in chart - informed son = cannot assess or advise without consent     [facility] and [facility] this AM -- and needs stitches -- injured forehead   Son states he will bring pt to [facility]  Triage Nurse Advisor

## 2022-01-26 NOTE — ED NOTES
Bacitracin, non-adherent dressing, and kerlix applied to bilateral hand wounds. Bacitracin applied to forehead and nose.

## 2022-01-26 NOTE — ED NOTES
Emergency Department Technician Wound Irrigation Note:    1/26/2022    11:29 AM      Wound location:  Right eyebrow, nose, bilateral dorsal hand     Irrigation Fluid: Normal Saline    Estimated Irrigation Volume (60 mL fluid per cm): 1200 mL in total     Kelsi Beverly

## 2022-01-26 NOTE — ED PROVIDER NOTES
History     Chief Complaint:  Fall and Head Injury      HPI   Raulito Iyer is a 89 year old male who presents after a fall.  The patient lives in a memory care facility.  He is here with his son who states that the patient fell around 630.  The son was able to get there around 9:00 and then brought him to the emergency department.  The patient denies any dizziness and does not have a clear recollection of how he fell.  Per the nursing triage note, staff had seen him on the way to the bathroom and the staff member came back about half an hour later and found the patient sitting in his chair holding a towel.     Review of Systems   Unable to perform ROS: Dementia   Skin: Positive for wound.   Neurological: Negative for dizziness and light-headedness.       Allergies:  No Known Allergies      Medications:    The patient's son states he takes no medications (not even aspirin)    Medications listed in Epic:  acetaminophen (TYLENOL) 500 MG tablet  aspirin (ASA) 325 MG EC tablet  polyethylene glycol (MIRALAX) 17 g packet  senna-docusate (SENOKOT-S/PERICOLACE) 8.6-50 MG tablet        Past Medical History:    Past Medical History:   Diagnosis Date     Abrasion, right knee, subsequent encounter 09/26/2020     Fracture of femoral neck, right (H)      Fracture of unspecified part of neck of right femur, subsequent encounter for closed fracture with routine healing 09/26/2020     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 behavioral disturbance (H) 4/5/2017     Personal history of transient ischemic attack (TIA), and cerebral infarction without residual deficits 09/26/2020     Past Surgical History:    Past Surgical History:   Procedure Laterality Date     NO HISTORY OF SURGERY       OPEN REDUCTION INTERNAL FIXATION HIP NAILING Right 9/24/2020    Procedure: PERCUTANEOUS SCREW FIXATION;  Surgeon: Andrew Aranda MD;  Location:  OR       Social History:  Presents to the ED with his son.    Physical Exam     Patient Vitals for the past  24 hrs:   BP Temp Temp src Pulse Resp SpO2   01/26/22 1323 (!) 138/90 -- -- 88 18 99 %   01/26/22 0957 (!) 145/98 98.3  F (36.8  C) Oral 93 22 94 %       Physical Exam  Constitutional: Vital signs reviewed as above.   Head: Laceration above the right eyebrow and on the bridge of the nose.  Eyes: Pupils are equal, round, and reactive to light.   Neck: No JVD noted  Cardiovascular: Normal rate, regular rhythm and normal heart sounds.  No murmur heard. Equal B/L peripheral pulses.  Pulmonary/Chest: Effort normal and breath sounds normal. No respiratory distress. Patient has no wheezes. Patient has no rales.   Gastrointestinal: Soft. There is no tenderness.   Musculoskeletal/Extremities: No extremity deformities appreciated.  The patient has normal range of motion of his hands.  He denies pain in his hands.  Neurological: Patient is alert and oriented to person and birthday. He does have dementia.   Skin: Skin is warm and dry. There is no diaphoresis noted.  Notable skin tears on the dorsal surfaces of both hands.  The left is greater than the right.  There is a laceration above the right eyebrow and on the bridge of the nose as well.  Psychiatric: The patient appears calm.      Emergency Department Course     Imaging:  Cervical spine CT w/o contrast   Final Result   IMPRESSION:   No acute cervical spine fracture.      KIERAN HUTCHINSON MD            SYSTEM ID:  [id number]      Head CT w/o contrast   Final Result   IMPRESSION:   1.  Small frontal scalp hematoma. No fracture.   2.  No acute intracranial hemorrhage.      KIERAN HUTCHINSON MD            SYSTEM ID:  [id number]          Laboratory:  Labs Ordered and Resulted from Time of ED Arrival to Time of ED Departure - No data to display    Procedures:    Laceration Repair        LACERATION:  A superficial minimally Contaminated 3 cm laceration.      LOCATION:  Right forehead      FUNCTION:  Distally sensation, circulation, motor and tendon function are  intact.      ANESTHESIA:  Local using Lidocaine 1% with epi total of 2 mLs      PREPARATION:  Irrigation with Normal Saline      DEBRIDEMENT:  wound explored, no foreign body found      CLOSURE:  Wound was closed with One Layer.  Skin closed with 6 x 5.0 Ethylon using interrupted sutures.      Laceration Repair        LACERATION:  A simple and superficial minimally Contaminated 1 cm laceration.      LOCATION:  Nasal bridge      ANESTHESIA:  Local using lidocaine 1% with epinephrine total of 0.5 mLs      PREPARATION:  Irrigation with Normal Saline      DEBRIDEMENT:  wound explored, no foreign body found      CLOSURE:  Wound was closed with One Layer.  Skin closed with 1 x 5.0 Ethylon using interrupted sutures.      Laceration Repair        LACERATION:  A subcutaneous and complex minimally Contaminated 10.5 cm laceration.  The wound had 2 flaps.  These were secured as below.  The total repaired wound length was 10.5 cm      LOCATION:  Left dorsal hand      FUNCTION:  Distally sensation, circulation, motor and tendon function are intact.      ANESTHESIA:  Local using lidocaine 1% with epinephrine total of 4 mLs      PREPARATION:  Irrigation with Normal Saline      DEBRIDEMENT:  wound explored, no foreign body found      CLOSURE:  Wound was closed with One Layer.  Skin closed with 17 x 5.0 Ethylon using interrupted sutures.        Emergency Department Course:           Reviewed:    I reviewed nursing notes, vitals and past history    Assessments/Consults:       ED Course as of 01/26/22 1715   Wed Jan 26, 2022   1007 Dr. Parish' evaluation.   1114 Rechecked and updated.       Interventions:    Medications - No data to display    Disposition:  The patient was discharged to home.    Impression & Plan      CMS Diagnoses:         Medical Decision Making:  This 89-year-old male patient presents to the ED after a fall.  Please see the HPI and exam for specifics.  The patient remained stable in the ED.  His lacerations were  anesthetized, cleaned, explored, and repaired as above.  CT imaging, fortunately, does not reveal any skull fracture or intracranial hemorrhage nor does it reveal any cervical spine fracture.  The patient tolerated his repair well and I think he can be discharged.  The patient's son will take him back home and spend the day with him.  The patient's son notes that in the past when he has had skin tears, he would  the bandages so we will try to keep the bandage on today but if the presence of that bothers the patient too much it would not be unreasonable to take that off and cover the area with bacitracin.  Anticipatory guidance given to patient's son prior to discharge including specifics on wound care.      Covid-19  Raulito Iyer was evaluated during a global COVID-19 pandemic, which necessitated consideration that the patient might be at risk for infection with the SARS-CoV-2 virus that causes COVID-19.   Applicable protocols for evaluation were followed during the patient's care.      Diagnosis:    ICD-10-CM    1. Fall, initial encounter  W19.XXXA    2. Laceration of forehead, initial encounter  S01.81XA    3. Laceration of nose, initial encounter  S01.21XA    4. Laceration of left hand, foreign body presence unspecified, initial encounter  S61.412A        Discharge Medications:  Discharge Medication List as of 1/26/2022  1:07 PM               Say Parish,   01/26/22 1716

## 2022-01-26 NOTE — DISCHARGE INSTRUCTIONS
What do you do next:   Continue your home medications unless we have specifically changed them  Keep your wounds clean and dry.  Do not soak them.  You may gently use soap and water to cleanse them.  You may place a layer of bacitracin over the cuts.  Follow up as indicated below    When do you return: If you have uncontrolled fevers, intractable pain, persistent bloody or foul-smelling drainage from your wounds, or any other symptoms that concern you, please return to the ED for reevaluation.    Thank you for allowing us to care for you today.

## 2022-01-26 NOTE — ED TRIAGE NOTES
Patient presents with family member from Memory Care at [facility] with a fall, head injury and skin tear to right hand. Staff member said last time he saw him he was on his way to the bathroom. Patient states he was in the bathroom and slipped and fell. The staff member returned 30 minutes later and found patient sitting in chair holding towel to head. Poor historian due to dementia. No blood thinners.

## 2022-01-27 ENCOUNTER — LAB REQUISITION (OUTPATIENT)
Dept: LAB | Facility: CLINIC | Age: 87
End: 2022-01-27
Payer: MEDICARE

## 2022-01-27 DIAGNOSIS — R35.0 FREQUENCY OF MICTURITION: ICD-10-CM

## 2022-01-27 LAB
ALBUMIN UR-MCNC: 20 MG/DL
APPEARANCE UR: ABNORMAL
BACTERIA #/AREA URNS HPF: ABNORMAL /HPF
BILIRUB UR QL STRIP: NEGATIVE
COLOR UR AUTO: ABNORMAL
GLUCOSE UR STRIP-MCNC: NEGATIVE MG/DL
HGB UR QL STRIP: NEGATIVE
KETONES UR STRIP-MCNC: ABNORMAL MG/DL
LEUKOCYTE ESTERASE UR QL STRIP: ABNORMAL
MUCOUS THREADS #/AREA URNS LPF: PRESENT /LPF
NITRATE UR QL: NEGATIVE
PH UR STRIP: 5.5 [PH] (ref 5–7)
RBC URINE: 4 /HPF
SP GR UR STRIP: 1.03 (ref 1–1.03)
SQUAMOUS EPITHELIAL: 6 /HPF
UROBILINOGEN UR STRIP-MCNC: <2 MG/DL
WBC URINE: 23 /HPF

## 2022-01-27 PROCEDURE — 87086 URINE CULTURE/COLONY COUNT: CPT | Mod: ORL | Performed by: FAMILY MEDICINE

## 2022-01-27 PROCEDURE — 81001 URINALYSIS AUTO W/SCOPE: CPT | Mod: ORL | Performed by: FAMILY MEDICINE

## 2022-01-28 LAB — BACTERIA UR CULT: NORMAL

## 2022-07-01 ENCOUNTER — LAB REQUISITION (OUTPATIENT)
Dept: LAB | Facility: CLINIC | Age: 87
End: 2022-07-01
Payer: MEDICARE

## 2022-07-01 DIAGNOSIS — D64.9 ANEMIA, UNSPECIFIED: ICD-10-CM

## 2022-07-01 DIAGNOSIS — I10 ESSENTIAL (PRIMARY) HYPERTENSION: ICD-10-CM

## 2022-07-05 LAB
ANION GAP SERPL CALCULATED.3IONS-SCNC: 9 MMOL/L (ref 7–15)
BUN SERPL-MCNC: 10.6 MG/DL (ref 8–23)
CALCIUM SERPL-MCNC: 9 MG/DL (ref 8.2–9.6)
CHLORIDE SERPL-SCNC: 106 MMOL/L (ref 98–107)
CREAT SERPL-MCNC: 0.85 MG/DL (ref 0.67–1.17)
DEPRECATED HCO3 PLAS-SCNC: 28 MMOL/L (ref 22–29)
ERYTHROCYTE [DISTWIDTH] IN BLOOD BY AUTOMATED COUNT: 14.4 % (ref 10–15)
GFR SERPL CREATININE-BSD FRML MDRD: 82 ML/MIN/1.73M2
GLUCOSE SERPL-MCNC: 77 MG/DL (ref 70–99)
HCT VFR BLD AUTO: 48 % (ref 40–53)
HGB BLD-MCNC: 15.6 G/DL (ref 13.3–17.7)
MCH RBC QN AUTO: 30.7 PG (ref 26.5–33)
MCHC RBC AUTO-ENTMCNC: 32.5 G/DL (ref 31.5–36.5)
MCV RBC AUTO: 95 FL (ref 78–100)
PLATELET # BLD AUTO: 331 10E3/UL (ref 150–450)
POTASSIUM SERPL-SCNC: 4.2 MMOL/L (ref 3.4–5.3)
RBC # BLD AUTO: 5.08 10E6/UL (ref 4.4–5.9)
SODIUM SERPL-SCNC: 143 MMOL/L (ref 136–145)
WBC # BLD AUTO: 8.7 10E3/UL (ref 4–11)

## 2022-07-05 PROCEDURE — 36415 COLL VENOUS BLD VENIPUNCTURE: CPT | Mod: ORL | Performed by: PHYSICIAN ASSISTANT

## 2022-07-05 PROCEDURE — 80048 BASIC METABOLIC PNL TOTAL CA: CPT | Mod: ORL | Performed by: PHYSICIAN ASSISTANT

## 2022-07-05 PROCEDURE — P9604 ONE-WAY ALLOW PRORATED TRIP: HCPCS | Mod: ORL | Performed by: PHYSICIAN ASSISTANT

## 2022-07-05 PROCEDURE — 85027 COMPLETE CBC AUTOMATED: CPT | Mod: ORL | Performed by: PHYSICIAN ASSISTANT

## 2022-10-25 ENCOUNTER — TRANSFERRED RECORDS (OUTPATIENT)
Dept: HEALTH INFORMATION MANAGEMENT | Facility: CLINIC | Age: 87
End: 2022-10-25

## 2022-11-17 ENCOUNTER — HOSPITAL ENCOUNTER (EMERGENCY)
Facility: CLINIC | Age: 87
Discharge: HOME | End: 2022-11-17
Attending: EMERGENCY MEDICINE | Admitting: EMERGENCY MEDICINE
Payer: MEDICARE

## 2022-11-17 ENCOUNTER — APPOINTMENT (OUTPATIENT)
Dept: GENERAL RADIOLOGY | Facility: CLINIC | Age: 87
End: 2022-11-17
Attending: EMERGENCY MEDICINE
Payer: MEDICARE

## 2022-11-17 VITALS
RESPIRATION RATE: 18 BRPM | TEMPERATURE: 97.9 F | HEART RATE: 75 BPM | SYSTOLIC BLOOD PRESSURE: 119 MMHG | DIASTOLIC BLOOD PRESSURE: 69 MMHG | OXYGEN SATURATION: 95 %
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
## 2022-11-17 DIAGNOSIS — S51.011A ELBOW LACERATION, RIGHT, INITIAL ENCOUNTER: ICD-10-CM

## 2022-11-17 LAB
ALBUMIN SERPL BCG-MCNC: 3.6 G/DL (ref 3.5–5.2)
ALBUMIN UR-MCNC: NEGATIVE MG/DL
ALP SERPL-CCNC: 74 U/L (ref 40–129)
ALT SERPL W P-5'-P-CCNC: 9 U/L (ref 10–50)
ANION GAP SERPL CALCULATED.3IONS-SCNC: 12 MMOL/L (ref 7–15)
APPEARANCE UR: CLEAR
AST SERPL W P-5'-P-CCNC: 15 U/L (ref 10–50)
BASOPHILS # BLD AUTO: 0.1 10E3/UL (ref 0–0.2)
BASOPHILS NFR BLD AUTO: 1 %
BILIRUB SERPL-MCNC: 0.5 MG/DL
BILIRUB UR QL STRIP: NEGATIVE
BUN SERPL-MCNC: 12.5 MG/DL (ref 8–23)
CALCIUM SERPL-MCNC: 8.7 MG/DL (ref 8.2–9.6)
CHLORIDE SERPL-SCNC: 101 MMOL/L (ref 98–107)
COLOR UR AUTO: YELLOW
CREAT SERPL-MCNC: 0.75 MG/DL (ref 0.67–1.17)
DEPRECATED HCO3 PLAS-SCNC: 24 MMOL/L (ref 22–29)
EOSINOPHIL # BLD AUTO: 0.3 10E3/UL (ref 0–0.7)
EOSINOPHIL NFR BLD AUTO: 2 %
ERYTHROCYTE [DISTWIDTH] IN BLOOD BY AUTOMATED COUNT: 14.1 % (ref 10–15)
GFR SERPL CREATININE-BSD FRML MDRD: 85 ML/MIN/1.73M2
GLUCOSE SERPL-MCNC: 118 MG/DL (ref 70–99)
GLUCOSE UR STRIP-MCNC: NEGATIVE MG/DL
HCT VFR BLD AUTO: 47.9 % (ref 40–53)
HGB BLD-MCNC: 15.5 G/DL (ref 13.3–17.7)
HGB UR QL STRIP: NEGATIVE
HYALINE CASTS: 3 /LPF
IMM GRANULOCYTES # BLD: 0.1 10E3/UL
IMM GRANULOCYTES NFR BLD: 1 %
KETONES UR STRIP-MCNC: NEGATIVE MG/DL
LEUKOCYTE ESTERASE UR QL STRIP: NEGATIVE
LYMPHOCYTES # BLD AUTO: 3.2 10E3/UL (ref 0.8–5.3)
LYMPHOCYTES NFR BLD AUTO: 24 %
MCH RBC QN AUTO: 31.5 PG (ref 26.5–33)
MCHC RBC AUTO-ENTMCNC: 32.4 G/DL (ref 31.5–36.5)
MCV RBC AUTO: 97 FL (ref 78–100)
MONOCYTES # BLD AUTO: 1.5 10E3/UL (ref 0–1.3)
MONOCYTES NFR BLD AUTO: 12 %
MUCOUS THREADS #/AREA URNS LPF: PRESENT /LPF
NEUTROPHILS # BLD AUTO: 8 10E3/UL (ref 1.6–8.3)
NEUTROPHILS NFR BLD AUTO: 60 %
NITRATE UR QL: NEGATIVE
NRBC # BLD AUTO: 0 10E3/UL
NRBC BLD AUTO-RTO: 0 /100
PH UR STRIP: 5 [PH] (ref 5–7)
PLATELET # BLD AUTO: 304 10E3/UL (ref 150–450)
POTASSIUM SERPL-SCNC: 3.8 MMOL/L (ref 3.4–5.3)
PROT SERPL-MCNC: 6.4 G/DL (ref 6.4–8.3)
RBC # BLD AUTO: 4.92 10E6/UL (ref 4.4–5.9)
RBC URINE: 1 /HPF
SODIUM SERPL-SCNC: 137 MMOL/L (ref 136–145)
SP GR UR STRIP: 1.02 (ref 1–1.03)
SQUAMOUS EPITHELIAL: <1 /HPF
UROBILINOGEN UR STRIP-MCNC: NORMAL MG/DL
WBC # BLD AUTO: 13.1 10E3/UL (ref 4–11)
WBC URINE: 1 /HPF

## 2022-11-17 PROCEDURE — 12034 INTMD RPR S/TR/EXT 7.6-12.5: CPT

## 2022-11-17 PROCEDURE — 36415 COLL VENOUS BLD VENIPUNCTURE: CPT | Performed by: EMERGENCY MEDICINE

## 2022-11-17 PROCEDURE — 73562 X-RAY EXAM OF KNEE 3: CPT | Mod: RT

## 2022-11-17 PROCEDURE — 99285 EMERGENCY DEPT VISIT HI MDM: CPT

## 2022-11-17 PROCEDURE — 80053 COMPREHEN METABOLIC PANEL: CPT | Performed by: EMERGENCY MEDICINE

## 2022-11-17 PROCEDURE — 81003 URINALYSIS AUTO W/O SCOPE: CPT | Performed by: EMERGENCY MEDICINE

## 2022-11-17 PROCEDURE — 250N000013 HC RX MED GY IP 250 OP 250 PS 637: Performed by: EMERGENCY MEDICINE

## 2022-11-17 PROCEDURE — 85004 AUTOMATED DIFF WBC COUNT: CPT | Performed by: EMERGENCY MEDICINE

## 2022-11-17 PROCEDURE — 73070 X-RAY EXAM OF ELBOW: CPT | Mod: RT

## 2022-11-17 RX ORDER — CEPHALEXIN 500 MG/1
500 CAPSULE ORAL 3 TIMES DAILY
Qty: 30 CAPSULE | Refills: 0 | Status: SHIPPED | OUTPATIENT
Start: 2022-11-17

## 2022-11-17 RX ORDER — CEPHALEXIN 500 MG/1
500 CAPSULE ORAL ONCE
Status: COMPLETED | OUTPATIENT
Start: 2022-11-17 | End: 2022-11-17

## 2022-11-17 RX ORDER — CEPHALEXIN 500 MG/1
500 CAPSULE ORAL 3 TIMES DAILY
Qty: 30 CAPSULE | Refills: 0 | Status: SHIPPED | OUTPATIENT
Start: 2022-11-17 | End: 2022-11-17

## 2022-11-17 RX ADMIN — CEPHALEXIN 500 MG: 500 CAPSULE ORAL at 17:05

## 2022-11-17 ASSESSMENT — ACTIVITIES OF DAILY LIVING (ADL)
ADLS_ACUITY_SCORE: 35
ADLS_ACUITY_SCORE: 35

## 2022-11-17 ASSESSMENT — ENCOUNTER SYMPTOMS
NECK PAIN: 0
BACK PAIN: 0
WOUND: 1

## 2022-11-17 NOTE — DISCHARGE INSTRUCTIONS
Follow-up with your clinic or return to ER in 2 days for wound check.    Return immediately for fever, increased pain or swelling, redness or discharge, or for any new concerns.    Clean the wound with warm soapy water each day and apply new antibiotic ointment with a dressing.    You will need the stitches removed in 10 to 14 days.

## 2022-11-17 NOTE — ED NOTES
Rapid Assessment Note    History:   Raulito Iyer is a 89 year old male who presents after a fall. Today, patient's son reports that he fell. Patient is unsure how he fell. Patient reports that he fell on his right elbow causing a laceration. Patient denies neck pain and back pain. Patient lives in memory care.    Exam:   General:  Alert, interactive  Cardiovascular:  Well perfused  Lungs:  No respiratory distress, no accessory muscle use  Neuro:  Moving all 4 extremities  Skin:  Warm, dry  Psych:  Normal affect      Plan of Care:   I evaluated the patient and developed an initial plan of care. I discussed this plan and explained that I, or one of my partners, would be returning to complete the evaluation.     I, Meera Abraham, am serving as a scribe to document services personally performed by Cecil Esquivel MD based on my observations and the provider's statements to me.    11/17/2022  [employer]    Portions of this medical record were completed by a scribe. UPON MY REVIEW AND AUTHENTICATION BY ELECTRONIC SIGNATURE, this confirms (a) I performed the applicable clinical services, and (b) the record is accurate.        Cecil Esquivel MD  11/17/22 0590

## 2022-11-17 NOTE — ED TRIAGE NOTES
Pt presents with son for evaluation after a fall. Resides at [facility] in the  unit. Staff went to check on pt around 0930 and found him sitting in the chair bleeding from his right elbow. Significant laceration noted with a saturated dressing. Dressing changed, pressure applied. Per son, it took 45 minutes out of the chair. Confused at baseline. Per son, blood on the chair and floor by the chair in the apartment, but no where else. Pt denies any pain and doesn't know what happened to his arm.

## 2022-11-17 NOTE — ED PROVIDER NOTES
History   Chief Complaint:  Fall       The history is provided by the patient and a relative (son).      Raulito Iyer is a 89 year old male with history of dementia who presents after a fall. Today, patient's son reports that he fell. Patient is unsure how he fell. At 0930, staff went to check on the patient and found him on the ground. Patient reports that he fell on his right elbow causing a laceration. He is confused at baseline. Patient denies neck pain and back pain. Patient lives in memory care.    Review of Systems   Musculoskeletal: Negative for back pain and neck pain.   Skin: Positive for wound (right arm).   All other systems reviewed and are negative.        Allergies:  The patient has no known allergies.     Medications:  The patient is currently on no regular medications.    Past Medical History:     Dementia without behavioral disturbance   Femoral neck fracture    Past Surgical History:    Open reduction of hip     Social History:  The patient presents to the ED with son.   Patient lives in memory care.    Physical Exam     Patient Vitals for the past 24 hrs:   BP Temp Temp src Pulse Resp SpO2   11/17/22 1533 -- -- -- 73 18 97 %   11/17/22 1411 (!) 118/91 97.9  F (36.6  C) Temporal 80 18 97 %       Physical Exam  Constitutional:       General: He is not in acute distress.     Appearance: He is not diaphoretic.   HENT:      Head: Atraumatic.      Mouth/Throat:      Mouth: Oropharynx is clear and moist.   Eyes:      General: No scleral icterus.     Pupils: Pupils are equal, round, and reactive to light.   Neck:      Comments: No midline C-spine tenderness.  No tenderness of the thoracic or lumbosacral spine.  No pelvic instability or tenderness.  Cardiovascular:      Rate and Rhythm: Normal rate and regular rhythm.      Pulses: Intact distal pulses.      Heart sounds: Normal heart sounds.   Pulmonary:      Effort: No respiratory distress.      Breath sounds: Normal breath sounds.   Abdominal:       General: Bowel sounds are normal.      Palpations: Abdomen is soft.      Tenderness: There is no abdominal tenderness.   Musculoskeletal:         General: No edema.      Comments: There is a contusion of the anterior right knee.  There is a laceration measuring 8 cm overlying the right elbow.  The laceration is deep but does not penetrate the joint capsule.   Skin:     General: Skin is warm.      Capillary Refill: Capillary refill takes less than 2 seconds.      Findings: No rash.   Neurological:      General: No focal deficit present.      Comments: Oriented to person and place   Psychiatric:         Mood and Affect: Mood normal.         Behavior: Behavior normal.       Emergency Department Course     Imaging:  XR Elbow Right 2 Views   Final Result   IMPRESSION: No fracture or dislocation. Small effusion. No   degenerative changes. Bandage material along the posterior margin of   the proximal olecranon process.      LYNNE MANCILLA MD            SYSTEM ID:  [id number]      XR Knee Right 3 Views   Final Result   IMPRESSION: No fracture. No effusion. Advanced degenerative changes in   the medial compartment. Mild degenerative changes in the lateral and   patellofemoral compartments.      LYNNE MANCILLA MD            SYSTEM ID:  [id number]        Report per radiology    Laboratory:  Labs Ordered and Resulted from Time of ED Arrival to Time of ED Departure   COMPREHENSIVE METABOLIC PANEL - Abnormal       Result Value    Sodium 137      Potassium 3.8      Chloride 101      Carbon Dioxide (CO2) 24      Anion Gap 12      Urea Nitrogen 12.5      Creatinine 0.75      Calcium 8.7      Glucose 118 (*)     Alkaline Phosphatase 74      AST 15      ALT 9 (*)     Protein Total 6.4      Albumin 3.6      Bilirubin Total 0.5      GFR Estimate 85     ROUTINE UA WITH MICROSCOPIC REFLEX TO CULTURE - Abnormal    Color Urine Yellow      Appearance Urine Clear      Glucose Urine Negative      Bilirubin Urine Negative      Ketones  Urine Negative      Specific Gravity Urine 1.021      Blood Urine Negative      pH Urine 5.0      Protein Albumin Urine Negative      Urobilinogen Urine Normal      Nitrite Urine Negative      Leukocyte Esterase Urine Negative      Mucus Urine Present (*)     RBC Urine 1      WBC Urine 1      Squamous Epithelials Urine <1      Hyaline Casts Urine 3 (*)    CBC WITH PLATELETS AND DIFFERENTIAL - Abnormal    WBC Count 13.1 (*)     RBC Count 4.92      Hemoglobin 15.5      Hematocrit 47.9      MCV 97      MCH 31.5      MCHC 32.4      RDW 14.1      Platelet Count 304      % Neutrophils 60      % Lymphocytes 24      % Monocytes 12      % Eosinophils 2      % Basophils 1      % Immature Granulocytes 1      NRBCs per 100 WBC 0      Absolute Neutrophils 8.0      Absolute Lymphocytes 3.2      Absolute Monocytes 1.5 (*)     Absolute Eosinophils 0.3      Absolute Basophils 0.1      Absolute Immature Granulocytes 0.1      Absolute NRBCs 0.0          Procedures    Laceration Repair      Procedure: Laceration Repair    Indication: Laceration    Consent: Verbal    Location: Right Elbow    Length: 8 cm    Preparation: Irrigation with Sterile Saline.    Anesthesia/Sedation: Lidocaine - 1%      Treatment/Exploration: Wound explored, no foreign bodies found     Closure: The wound was closed with a layer of deep sutures with 4-0 Vicryl and 4-0 Chromic Gut, 2 each.  The skin was closed with 8 horizontal mattress sutures with 3-0 Ethilon.    Patient Status: The patient tolerated the procedure well: Yes. There were no complications.    Emergency Department Course:       Reviewed:  I reviewed nursing notes, vitals and past medical history    Assessments:  1423 I obtained history and examined the patient as noted above.   1602 I rechecked the patient and I performed a laceration repair procedure. Patient tolerated the procedure well. At this point I feel that the patient is safe for discharge, and the patient and son agree.      Disposition:  The patient was discharged to home.     Impression & Plan     Medical Decision Making:  This patient is a 89-year-old man who presents from his memory care unit with his son.  He had a fall today but does not recall the event.  Staff not witnessed any syncopal episode.  The patient does have a large wound overlying the extensor surface of the right elbow.  The wound is deep through multiple layers.  There does not appear to be penetration of the joint capsule however.  The patient had a layered closure with good hemostasis.  Given the depth of the wound he will be covered with Keflex.  We discussed that he needs a wound check in 2 days.  We discussed signs of infection for which he should return.    Diagnosis:    ICD-10-CM    1. Elbow laceration, right, initial encounter  S51.011A       2. Fall, initial encounter  W19.XXXA           Discharge Medications:  New Prescriptions    CEPHALEXIN (KEFLEX) 500 MG CAPSULE    Take 1 capsule (500 mg) by mouth 3 times daily for 10 days       Scribe Disclosure:  IMeera, am serving as a scribe at 3:39 PM on 11/17/2022 to document services personally performed by Cecil Esquivel MD, based on my observations and the provider's statements to me.          Cecil Esquivel MD  11/17/22 9457

## 2022-11-23 ENCOUNTER — DOCUMENTATION ONLY (OUTPATIENT)
Dept: OTHER | Facility: CLINIC | Age: 87
End: 2022-11-23

## 2023-02-26 ENCOUNTER — LAB REQUISITION (OUTPATIENT)
Dept: LAB | Facility: CLINIC | Age: 88
End: 2023-02-26
Payer: MEDICARE

## 2023-02-26 DIAGNOSIS — D64.9 ANEMIA, UNSPECIFIED: ICD-10-CM

## 2023-02-26 DIAGNOSIS — I10 ESSENTIAL (PRIMARY) HYPERTENSION: ICD-10-CM

## 2023-02-28 LAB
ALBUMIN SERPL BCG-MCNC: 3.4 G/DL (ref 3.5–5.2)
ALP SERPL-CCNC: 67 U/L (ref 40–129)
ALT SERPL W P-5'-P-CCNC: 7 U/L (ref 10–50)
ANION GAP SERPL CALCULATED.3IONS-SCNC: 12 MMOL/L (ref 7–15)
AST SERPL W P-5'-P-CCNC: 20 U/L (ref 10–50)
BILIRUB SERPL-MCNC: 0.4 MG/DL
BUN SERPL-MCNC: 15.8 MG/DL (ref 8–23)
CALCIUM SERPL-MCNC: 8.8 MG/DL (ref 8.2–9.6)
CHLORIDE SERPL-SCNC: 107 MMOL/L (ref 98–107)
CREAT SERPL-MCNC: 0.84 MG/DL (ref 0.67–1.17)
DEPRECATED HCO3 PLAS-SCNC: 23 MMOL/L (ref 22–29)
ERYTHROCYTE [DISTWIDTH] IN BLOOD BY AUTOMATED COUNT: 14 % (ref 10–15)
GFR SERPL CREATININE-BSD FRML MDRD: 82 ML/MIN/1.73M2
GLUCOSE SERPL-MCNC: 146 MG/DL (ref 70–99)
HCT VFR BLD AUTO: 43.5 % (ref 40–53)
HGB BLD-MCNC: 13.5 G/DL (ref 13.3–17.7)
MCH RBC QN AUTO: 30.7 PG (ref 26.5–33)
MCHC RBC AUTO-ENTMCNC: 31 G/DL (ref 31.5–36.5)
MCV RBC AUTO: 99 FL (ref 78–100)
PLATELET # BLD AUTO: 304 10E3/UL (ref 150–450)
POTASSIUM SERPL-SCNC: 3.7 MMOL/L (ref 3.4–5.3)
PROT SERPL-MCNC: 5.6 G/DL (ref 6.4–8.3)
RBC # BLD AUTO: 4.4 10E6/UL (ref 4.4–5.9)
SODIUM SERPL-SCNC: 142 MMOL/L (ref 136–145)
WBC # BLD AUTO: 8.9 10E3/UL (ref 4–11)

## 2023-02-28 PROCEDURE — 85027 COMPLETE CBC AUTOMATED: CPT | Mod: ORL | Performed by: PHYSICIAN ASSISTANT

## 2023-02-28 PROCEDURE — 80053 COMPREHEN METABOLIC PANEL: CPT | Mod: ORL | Performed by: PHYSICIAN ASSISTANT

## 2023-02-28 PROCEDURE — P9604 ONE-WAY ALLOW PRORATED TRIP: HCPCS | Mod: ORL | Performed by: PHYSICIAN ASSISTANT

## 2023-02-28 PROCEDURE — 36415 COLL VENOUS BLD VENIPUNCTURE: CPT | Mod: ORL | Performed by: PHYSICIAN ASSISTANT

## 2024-02-26 ENCOUNTER — LAB REQUISITION (OUTPATIENT)
Dept: LAB | Facility: CLINIC | Age: 89
End: 2024-02-26
Payer: MEDICARE

## 2024-02-26 DIAGNOSIS — I10 ESSENTIAL (PRIMARY) HYPERTENSION: ICD-10-CM

## 2024-02-26 DIAGNOSIS — D64.9 ANEMIA, UNSPECIFIED: ICD-10-CM

## 2024-02-27 LAB
ERYTHROCYTE [DISTWIDTH] IN BLOOD BY AUTOMATED COUNT: 14.6 % (ref 10–15)
HCT VFR BLD AUTO: 46.9 % (ref 40–53)
HGB BLD-MCNC: 15.3 G/DL (ref 13.3–17.7)
MCH RBC QN AUTO: 31.5 PG (ref 26.5–33)
MCHC RBC AUTO-ENTMCNC: 32.6 G/DL (ref 31.5–36.5)
MCV RBC AUTO: 97 FL (ref 78–100)
PLATELET # BLD AUTO: 347 10E3/UL (ref 150–450)
RBC # BLD AUTO: 4.86 10E6/UL (ref 4.4–5.9)
WBC # BLD AUTO: 11 10E3/UL (ref 4–11)

## 2024-02-27 PROCEDURE — 80053 COMPREHEN METABOLIC PANEL: CPT | Mod: ORL | Performed by: PHYSICIAN ASSISTANT

## 2024-02-27 PROCEDURE — P9604 ONE-WAY ALLOW PRORATED TRIP: HCPCS | Mod: ORL | Performed by: PHYSICIAN ASSISTANT

## 2024-02-27 PROCEDURE — 36415 COLL VENOUS BLD VENIPUNCTURE: CPT | Mod: ORL | Performed by: PHYSICIAN ASSISTANT

## 2024-02-27 PROCEDURE — 85027 COMPLETE CBC AUTOMATED: CPT | Mod: ORL | Performed by: PHYSICIAN ASSISTANT

## 2024-02-28 LAB
ALBUMIN SERPL BCG-MCNC: 3.8 G/DL (ref 3.5–5.2)
ALP SERPL-CCNC: 83 U/L (ref 40–150)
ALT SERPL W P-5'-P-CCNC: 9 U/L (ref 0–70)
ANION GAP SERPL CALCULATED.3IONS-SCNC: 12 MMOL/L (ref 7–15)
AST SERPL W P-5'-P-CCNC: 18 U/L (ref 0–45)
BILIRUB SERPL-MCNC: 0.5 MG/DL
BUN SERPL-MCNC: 15 MG/DL (ref 8–23)
CALCIUM SERPL-MCNC: 9 MG/DL (ref 8.2–9.6)
CHLORIDE SERPL-SCNC: 105 MMOL/L (ref 98–107)
CREAT SERPL-MCNC: 0.77 MG/DL (ref 0.67–1.17)
DEPRECATED HCO3 PLAS-SCNC: 24 MMOL/L (ref 22–29)
EGFRCR SERPLBLD CKD-EPI 2021: 83 ML/MIN/1.73M2
GLUCOSE SERPL-MCNC: 87 MG/DL (ref 70–99)
POTASSIUM SERPL-SCNC: 4.7 MMOL/L (ref 3.4–5.3)
PROT SERPL-MCNC: 6.3 G/DL (ref 6.4–8.3)
SODIUM SERPL-SCNC: 141 MMOL/L (ref 135–145)

## 2024-05-07 ENCOUNTER — TRANSFERRED RECORDS (OUTPATIENT)
Dept: HEALTH INFORMATION MANAGEMENT | Facility: CLINIC | Age: 89
End: 2024-05-07
Payer: MEDICARE

## 2024-05-07 ENCOUNTER — MEDICAL CORRESPONDENCE (OUTPATIENT)
Dept: HEALTH INFORMATION MANAGEMENT | Facility: CLINIC | Age: 89
End: 2024-05-07

## 2024-05-09 ENCOUNTER — TRANSFERRED RECORDS (OUTPATIENT)
Dept: HEALTH INFORMATION MANAGEMENT | Facility: CLINIC | Age: 89
End: 2024-05-09
Payer: MEDICARE

## 2024-05-13 ENCOUNTER — MEDICAL CORRESPONDENCE (OUTPATIENT)
Dept: HEALTH INFORMATION MANAGEMENT | Facility: CLINIC | Age: 89
End: 2024-05-13
Payer: MEDICARE

## 2024-05-31 ENCOUNTER — TELEPHONE (OUTPATIENT)
Dept: OTHER | Facility: CLINIC | Age: 89
End: 2024-05-31
Payer: MEDICARE

## 2024-05-31 DIAGNOSIS — I73.9 PAD (PERIPHERAL ARTERY DISEASE) (H): Primary | ICD-10-CM

## 2024-05-31 NOTE — TELEPHONE ENCOUNTER
Referral received via Fax on 5/29/24.    Pt referred to [facility] by Ajith Berrios PA-C for PAD with non healing right foot wound    Routing to scheduling to coordinate the following:  DAISY/TBI  NEW VASCULAR PATIENT consult with Vascular Surgery  Please schedule this within 1-2 weeks    Demographics confirmed with referral order.    Appt note:  Ref by Ajith Berrios PA-C ([facility]) for PAD with non healing right foot wound; DAISY/TBI prior; notes sent to [facility] to be scanned.    Georgia Moura, SKYN, RN, [facility] [place]

## 2024-06-18 ENCOUNTER — OFFICE VISIT (OUTPATIENT)
Dept: OTHER | Facility: CLINIC | Age: 89
End: 2024-06-18
Attending: SURGERY
Payer: MEDICARE

## 2024-06-18 ENCOUNTER — HOSPITAL ENCOUNTER (OUTPATIENT)
Dept: ULTRASOUND IMAGING | Facility: CLINIC | Age: 89
Discharge: HOME | End: 2024-06-18
Attending: SURGERY
Payer: MEDICARE

## 2024-06-18 VITALS — OXYGEN SATURATION: 97 % | HEART RATE: 72 BPM | SYSTOLIC BLOOD PRESSURE: 129 MMHG | DIASTOLIC BLOOD PRESSURE: 67 MMHG

## 2024-06-18 DIAGNOSIS — I73.9 PAD (PERIPHERAL ARTERY DISEASE) (H): ICD-10-CM

## 2024-06-18 DIAGNOSIS — F03.B0 MODERATE DEMENTIA WITHOUT BEHAVIORAL DISTURBANCE, PSYCHOTIC DISTURBANCE, MOOD DISTURBANCE, OR ANXIETY, UNSPECIFIED DEMENTIA TYPE (H): ICD-10-CM

## 2024-06-18 DIAGNOSIS — I73.9 PERIPHERAL VASCULAR DISEASE OF LOWER EXTREMITY WITH ULCERATION (H): Primary | ICD-10-CM

## 2024-06-18 DIAGNOSIS — L97.909 PERIPHERAL VASCULAR DISEASE OF LOWER EXTREMITY WITH ULCERATION (H): Primary | ICD-10-CM

## 2024-06-18 PROCEDURE — 93924 LWR XTR VASC STDY BILAT: CPT

## 2024-06-18 PROCEDURE — G0463 HOSPITAL OUTPT CLINIC VISIT: HCPCS | Mod: 25 | Performed by: SURGERY

## 2024-06-18 PROCEDURE — 99203 OFFICE O/P NEW LOW 30 MIN: CPT | Performed by: SURGERY

## 2024-06-18 RX ORDER — SERTRALINE HYDROCHLORIDE 25 MG/1
25 TABLET, FILM COATED ORAL DAILY
COMMUNITY

## 2024-06-18 NOTE — PROGRESS NOTES
[facility]        Patient is here for a consult to discuss Peripheral artery disease (PAD) and non healing right foot wound.  Pt is currently taking Aspirin.    /67 (BP Location: Right arm, Patient Position: Chair, Cuff Size: Adult Regular)   Pulse 72   SpO2 97%     The provider has been notified that the patient has no concerns.     Questions patient would like addressed today are: N/A.    Refills are needed: N/A    Has homecare services and agency name:  Virginia Valentin MA

## 2024-06-18 NOTE — PROGRESS NOTES
[facility] INITIAL VASCULAR SURGERY CONSULT    Impression:   1.  Moderate peripheral arterial disease with slowly healing traumatic wounds of the right second and third toes.    2.  Moderate dementia    3.  History of right femoral neck fracture    Plan:  I had a detailed conversation with Raulito's daughter regarding all the above.  He is largely nonambulatory and does not complain of rest pain.  Because of his dementia and nonambulatory status I am taking a more conservative approach.  Continue local care and wearing proper footwear.  Follow-up with me in 1 month for a recheck of the toes.  If there is deterioration consideration could be given to limited right lower extremity angiography with possible tibial angioplasty.  Hopefully we can avoid that.    All of her questions were answered and she verbalizes full understanding to the above and agreement with this management plan.  Total length of this encounter was 30 minutes with time spent reviewing studies, interviewing and examining the patient, answering questions, and coordinating a treatment plan.        HPI:   Raulito Iyer is a 89-year-old gentleman with moderate dementia who presents today with his daughter, Maria Elena who provided the meaningful history.  She noticed some abrasions on the anterior aspect of the right second and third toes a few weeks ago with some associated swelling.  She brought this to the attention of the caregivers at his nursing home.  A physicians assistant has been providing local care for these toes.  He is subsequently referred for vascular evaluation.  He is largely nonambulatory.  He does not complain of right foot rest pain.  He indicates the toes are a bit tender when they are squeeze.      CURRENT MEDICATIONS  Current Outpatient Medications   Medication Sig Dispense Refill    acetaminophen (TYLENOL) 500 MG tablet Take 1,000 mg by mouth every 6 hours as needed for mild pain      aspirin (ASA) 325  MG EC tablet Take 1 tablet (325 mg) by mouth daily 30 tablet 0    cephALEXin (KEFLEX) 500 MG capsule Take 1 capsule (500 mg) by mouth 3 times daily 30 capsule 0    polyethylene glycol (MIRALAX) 17 g packet Take 17 g by mouth daily as needed for constipation      senna-docusate (SENOKOT-S/PERICOLACE) 8.6-50 MG tablet Take 1 tablet by mouth 2 times daily as needed for constipation 30 tablet 0     No current facility-administered medications for this visit.         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 09/26/2020         PAST SURGICAL HISTORY:  Past Surgical History:   Procedure Laterality Date    NO HISTORY OF SURGERY      OPEN REDUCTION INTERNAL FIXATION HIP NAILING Right 9/24/2020    Procedure: PERCUTANEOUS SCREW FIXATION;  Surgeon: Andrew Aranda MD;  Location: [facility] OR       ALLERGIES   No Known Allergies    FAMILY HISTORY  Family History   Problem Relation Age of Onset    Other - See Comments No family hx of         Unable to obtain due to: dementia       SOCIAL HISTORY  Social History     Tobacco Use    Smoking status: Former    Smokeless tobacco: Never   Substance Use Topics    Alcohol use: Yes     Alcohol/week: 3.0 - 6.0 standard drinks of alcohol     Types: 3 - 6 Standard drinks or equivalent per week    Drug use: No       ROS:   Review of Systems   Unable to perform ROS: Dementia         EXAM:  There were no vitals taken for this visit.  Physical Exam  Constitutional:       Appearance: Normal appearance.   HENT:      Head: Normocephalic and atraumatic.   Eyes:      General: No scleral icterus.     " Extraocular Movements: Extraocular movements intact.      Pupils: Pupils are equal, round, and reactive to light.   Cardiovascular:      Pulses:           Femoral pulses are 2+ on the right side and 2+ on the left side.       Popliteal pulses are 2+ on the right side.        Dorsalis pedis pulses are detected w/ Doppler on the right side and detected w/ Doppler on the left side.        Posterior tibial pulses are detected w/ Doppler on the right side and detected w/ Doppler on the left side.   Musculoskeletal:      Right lower le+ Pitting Edema present.      Left lower le+ Pitting Edema present.   Skin:     General: Skin is warm and dry.   Neurological:      General: No focal deficit present.      Mental Status: He is oriented to person, place, and time. Mental status is at baseline.   Psychiatric:      Comments: Moderate dementia.  He can answer simple questions.  Judgment and thought content are very limited.           Right foot today.    Labs:  LIPID RESULTS:  No results found for: \"CHOL\", \"HDL\", \"LDL\", \"TRIG\", \"CHOLHDLRATIO\"    CBC RESULTS:  Lab Results   Component Value Date    WBC 11.0 2024    WBC 12.3 (H) 2020    RBC 4.86 2024    RBC 4.43 2020    HGB 15.3 2024    HGB 13.2 (A) 2020    HCT 46.9 2024    HCT 41.9 2020    MCV 97 2024    MCV 95 2020    MCH 31.5 2024    MCH 30.9 2020    MCHC 32.6 2024    MCHC 32.7 2020    RDW 14.6 2024    RDW 14.5 2020     2024     2020       BMP RESULTS:  Lab Results   Component Value Date     2024     2020    POTASSIUM 4.7 2024    POTASSIUM 3.8 2020    POTASSIUM 3.6 2020    CHLORIDE 105 2024    CHLORIDE 104 2020    CHLORIDE 109 2020    CO2 24 2024    CO2 26 2020    CO2 27 2020    ANIONGAP 12 2024    ANIONGAP 10 2020    ANIONGAP 7 2020    GLC 87 2024 " "   GLC 70 12/29/2020    GLC 88 09/30/2020    BUN 15.0 02/27/2024    BUN 12 12/29/2020    BUN 10 09/30/2020    CR 0.77 02/27/2024    CR 0.61 (A) 09/30/2020    GFRESTIMATED 83 02/27/2024    GFRESTIMATED >60 12/29/2020    GFRESTIMATED >60 09/30/2020    GFRESTBLACK >60 12/29/2020    GFRESTBLACK >90 09/25/2020    SHERRIE 9.0 02/27/2024    SHERRIE 8.1 (A) 09/30/2020        A1C RESULTS:  No results found for: \"A1C\"      Imaging:    I have reviewed an DAISY with toe pressures performed earlier today.      Javier Dunlap MD      " Yes

## 2024-06-19 ENCOUNTER — TELEPHONE (OUTPATIENT)
Dept: OTHER | Facility: CLINIC | Age: 89
End: 2024-06-19
Payer: MEDICARE

## 2024-06-19 NOTE — TELEPHONE ENCOUNTER
Per 6/18/24 visit with Dr. Dunlap, pt needs the following:    Clinic only visit with Dr. Dunlap  Please schedule this in one month  Follow up order entered.    Routing to scheduling to contact patient to coordinate above.    Appt note in order comments.    Georgia Moura, SKYN, RN, -[facility] [place]

## 2024-07-10 ENCOUNTER — TELEPHONE (OUTPATIENT)
Dept: OTHER | Facility: CLINIC | Age: 89
End: 2024-07-10
Payer: MEDICARE

## 2024-07-10 NOTE — TELEPHONE ENCOUNTER
[facility]    Who is the name of the provider?:  KARTIK GUZMAN   What is the location you see this provider at/preferred location?: [place]  Person calling: Maria Elena Castro (daughter)   Phone number:  [phone] (home)   Nurse call back needed:  NA     Reason for call:  Patient's daughter called and stated there is no change to the patient's foot. Daughter explained the patient has dementia and it is very hard to get him leave for appointments. The patient's daughter is wondering if this follow up can be rescheduled in another month due to no change in the patient's condition. Please advise and re-route to scheduling.       Outside Imaging: n/a   Can we leave a detailed message on this number?  YES     7/10/2024, 3:13 PM

## 2024-07-11 NOTE — TELEPHONE ENCOUNTER
Returned call to Maria Elena.    She reports that there has not been a change in patient's wounds.  Wound care sees patient 3 times weekly.  Due to patients status, would offer a phone visit with Dr. Dunlap in follow up in approximately 2 weeks.  She was in agreement to cancel the appointment for 7/12/24.    In the interim, Maria Elena will send a picture of patient's foot and I will review this with Dr. Dunlap.  Maria Elena agreed to also send a picture via [employer] closer to the rescheduled appointment.    Routing to scheduling to coordinate the following:  Phone visit with Dr. Dunlap in approximately 2 weeks  No imaging needed    Follow up order still available to link, along with appointment note.    SKY ParkinsonN, RN, [facility] [place]

## 2024-07-11 NOTE — TELEPHONE ENCOUNTER
Called patient's daughter (Maria Elena) to schedule:    Phone visit with Dr. Dunlap in approximately 2 weeks  No imaging needed    Maria Elena will call us back in about an hour or so.

## 2024-07-20 ENCOUNTER — HEALTH MAINTENANCE LETTER (OUTPATIENT)
Age: 89
End: 2024-07-20

## 2024-07-24 ENCOUNTER — VIRTUAL VISIT (OUTPATIENT)
Dept: OTHER | Facility: CLINIC | Age: 89
End: 2024-07-24
Attending: SURGERY
Payer: MEDICARE

## 2024-07-24 DIAGNOSIS — I73.9 PAD (PERIPHERAL ARTERY DISEASE) (H): ICD-10-CM

## 2024-07-24 DIAGNOSIS — F03.B0 MODERATE DEMENTIA WITHOUT BEHAVIORAL DISTURBANCE, PSYCHOTIC DISTURBANCE, MOOD DISTURBANCE, OR ANXIETY, UNSPECIFIED DEMENTIA TYPE (H): Primary | ICD-10-CM

## 2024-07-24 PROCEDURE — 99442 PR PHYSICIAN TELEPHONE EVALUATION 11-20 MIN: CPT | Mod: 93 | Performed by: SURGERY

## 2024-07-24 NOTE — PROGRESS NOTES
Raulito is a 89 year old who is being evaluated via a billable telephone visit.    What phone number would you like to be contacted at? [phone] Telephone Visit   (ph # daughter Maria Elena's) Hx of PAD with R toe wounds; 1 mo f/u to 6/18/24 appt with Dr. Dunlap. *LMB 07/11/24 **pictures sent in [employer] message on 7/17/24   How would you like to obtain your AVS? [employer]    Robina Valentin MA

## 2024-07-28 NOTE — PROGRESS NOTES
Raulito Iyer is a 89-year-old gentleman with moderate dementia previously evaluated by me for peripheral arterial disease with slowly healing traumatic wounds of his right second and third toes.  Please see that consultation from 6/18/2024.  I had extensive conversations then with his daughter Maria Elena regarding all of the above.  He had a palpable right-sided popliteal pulse and dopplerable right-sided pedal pulses.  Given his moderate dementia we chose a nonoperative approach continuing with observation and wound care.    Today I am conducting a 1 month follow-up by contacting Raulito's daughter, Maria Elena for an update.  I am calling her from my location at the [facility].  She sent a recent photograph of his foot and that can be seen in the media tab.  She thinks that it is somewhat improved.  Raulito is a resident of the memory care unit and ambulates only on a very limited basis to get to his dining facility.  He utilizes a diabetic shoe.  To the best of her knowledge, she does not believe that he is in pain.    On review of photographs in the media tab dated 7/16/2024 both the right second and third toes are less swollen and less erythematous.  Focal 2-3 mm superficial ulcerations remain on the dorsal aspect of the PIP joints.    ASSESSMENT:  1.  Moderate peripheral arterial disease and a small vessel distribution with slowly healing traumatic wounds of the right second and third toes.  Based on photographs from 7/16/2024 the wounds are improved.    2.  Moderate dementia.    PLAN:  I reviewed all the above with Maria Elena.  She understands that we would normally have already pursued right leg angiography with possible intervention.  Given Raulito's moderate dementia we mutually agreed to a more conservative approach with ongoing observation and best local wound care.  She would like to continue in that manner.  We discussed potential future follow-up.  She would prefer to keep follow-up on an as-needed basis.   She is advised to send us photographs of her dad's foot if she has any questions or concerns.  They will otherwise continue with the recommended local care offered at his memory care unit.    Total length of this encounter was 12 minutes.    Erik Dunlap MD

## 2024-08-28 ENCOUNTER — LAB REQUISITION (OUTPATIENT)
Dept: LAB | Facility: CLINIC | Age: 89
End: 2024-08-28
Payer: MEDICARE

## 2024-08-28 DIAGNOSIS — D64.9 ANEMIA, UNSPECIFIED: ICD-10-CM

## 2024-08-28 DIAGNOSIS — I10 ESSENTIAL (PRIMARY) HYPERTENSION: ICD-10-CM

## 2024-09-10 LAB
ANION GAP SERPL CALCULATED.3IONS-SCNC: 12 MMOL/L (ref 7–15)
BUN SERPL-MCNC: 16.7 MG/DL (ref 8–23)
CALCIUM SERPL-MCNC: 8.8 MG/DL (ref 8.8–10.4)
CHLORIDE SERPL-SCNC: 106 MMOL/L (ref 98–107)
CREAT SERPL-MCNC: 0.88 MG/DL (ref 0.67–1.17)
EGFRCR SERPLBLD CKD-EPI 2021: 80 ML/MIN/1.73M2
ERYTHROCYTE [DISTWIDTH] IN BLOOD BY AUTOMATED COUNT: 14.1 % (ref 10–15)
GLUCOSE SERPL-MCNC: 116 MG/DL (ref 70–99)
HCO3 SERPL-SCNC: 23 MMOL/L (ref 22–29)
HCT VFR BLD AUTO: 45.3 % (ref 40–53)
HGB BLD-MCNC: 14.5 G/DL (ref 13.3–17.7)
MCH RBC QN AUTO: 31.2 PG (ref 26.5–33)
MCHC RBC AUTO-ENTMCNC: 32 G/DL (ref 31.5–36.5)
MCV RBC AUTO: 97 FL (ref 78–100)
PLATELET # BLD AUTO: 349 10E3/UL (ref 150–450)
POTASSIUM SERPL-SCNC: 3.8 MMOL/L (ref 3.4–5.3)
RBC # BLD AUTO: 4.65 10E6/UL (ref 4.4–5.9)
SODIUM SERPL-SCNC: 141 MMOL/L (ref 135–145)
WBC # BLD AUTO: 9 10E3/UL (ref 4–11)

## 2024-09-10 PROCEDURE — 80048 BASIC METABOLIC PNL TOTAL CA: CPT | Mod: ORL | Performed by: PHYSICIAN ASSISTANT

## 2024-09-10 PROCEDURE — 85027 COMPLETE CBC AUTOMATED: CPT | Mod: ORL | Performed by: PHYSICIAN ASSISTANT

## 2024-09-10 PROCEDURE — P9604 ONE-WAY ALLOW PRORATED TRIP: HCPCS | Mod: ORL | Performed by: PHYSICIAN ASSISTANT

## 2024-09-10 PROCEDURE — 36415 COLL VENOUS BLD VENIPUNCTURE: CPT | Mod: ORL | Performed by: PHYSICIAN ASSISTANT

## 2025-04-03 ENCOUNTER — HOSPITAL ENCOUNTER (EMERGENCY)
Facility: CLINIC | Age: OVER 89
Discharge: HOME | End: 2025-04-04
Attending: EMERGENCY MEDICINE
Payer: MEDICARE
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
## 2025-04-03 DIAGNOSIS — S01.81XA LACERATION OF FOREHEAD, INITIAL ENCOUNTER: ICD-10-CM

## 2025-04-03 DIAGNOSIS — S00.83XA TRAUMATIC HEMATOMA OF FOREHEAD, INITIAL ENCOUNTER: ICD-10-CM

## 2025-04-03 PROCEDURE — 99284 EMERGENCY DEPT VISIT MOD MDM: CPT | Mod: 25

## 2025-04-03 PROCEDURE — 12011 RPR F/E/E/N/L/M 2.5 CM/<: CPT

## 2025-04-04 ENCOUNTER — APPOINTMENT (OUTPATIENT)
Dept: CT IMAGING | Facility: CLINIC | Age: OVER 89
End: 2025-04-04
Attending: EMERGENCY MEDICINE
Payer: MEDICARE

## 2025-04-04 VITALS
DIASTOLIC BLOOD PRESSURE: 88 MMHG | WEIGHT: 180 LBS | TEMPERATURE: 98.2 F | OXYGEN SATURATION: 96 % | SYSTOLIC BLOOD PRESSURE: 154 MMHG | BODY MASS INDEX: 24.38 KG/M2 | RESPIRATION RATE: 16 BRPM | HEIGHT: 72 IN | HEART RATE: 67 BPM

## 2025-04-04 PROCEDURE — 72125 CT NECK SPINE W/O DYE: CPT

## 2025-04-04 PROCEDURE — 70450 CT HEAD/BRAIN W/O DYE: CPT

## 2025-04-04 RX ORDER — LIDOCAINE HYDROCHLORIDE AND EPINEPHRINE 10; 10 MG/ML; UG/ML
5 INJECTION, SOLUTION INFILTRATION; PERINEURAL ONCE
Status: DISCONTINUED | OUTPATIENT
Start: 2025-04-04 | End: 2025-04-04 | Stop reason: HOSPADM

## 2025-04-04 ASSESSMENT — ACTIVITIES OF DAILY LIVING (ADL)
ADLS_ACUITY_SCORE: 48
ADLS_ACUITY_SCORE: 48

## 2025-04-04 NOTE — ED TRIAGE NOTES
Staff at 2230 was doing nightly rounds. Found pt sitting in his chair with a hematoma on his right forehead and a lac to his left forehead. Blood all over the apt. No there injuries. Alert x1, which is his baseline. Bg 166     Triage Assessment (Adult)       Row Name 04/03/25 5727          Triage Assessment    Airway WDL WDL        Respiratory WDL    Respiratory WDL WDL        Skin Circulation/Temperature WDL    Skin Circulation/Temperature WDL WDL        Peripheral/Neurovascular WDL    Peripheral Neurovascular WDL WDL        Cognitive/Neuro/Behavioral WDL    Cognitive/Neuro/Behavioral WDL X  pt is from the memory care unit. he is alert x1. which is his baseline        [place]a Coma Scale    Best Eye Response 4-->(E4) spontaneous     Best Motor Response 6-->(M6) obeys commands     Best Verbal Response 4-->(V4) confused     [place] Coma Scale Score 14

## 2025-04-04 NOTE — ED PROVIDER NOTES
Emergency Department Note      History of Present Illness     Chief Complaint   Fall (Unwitnessed fall at [facility] care unit)      KATHERYN Iyer is a 89 year old male with a history of dementia and TIA who presents to the ED via EMS for a fall. Per EMS report, the patient is coming from [facility], where he had an unwitnessed fall. The patient was found sitting in his chair by staff at 2230 during rounds. He had a laceration to the left side of his forehead and a hematoma on the right side of his forehead. There was blood all over the apartment. Staff suspects that he fell while using his walker to get to the bathroom and hit his head on the door while falling. They do not suspect any other injury. BG en route was 166. The patient provides limited history due to his dementia. He denies any pain in the abdomen, ribs, hips, or other areas.    Independent Historian   EMS as detailed above.    Review of External Notes   MIIC reviewed.  Tetanus up-to-date 9/23/2020.    Past Medical History     Medical History and Problem List   Fracture of right femoral neck  Moderate dementia  transient ischemic attack (TIA)    Medications   ASA  Keflex  Zoloft    Surgical History   Open reduction internal fixation hip nailing, right (9/24/20)    Physical Exam     Patient Vitals for the past 24 hrs:   BP Temp Pulse Resp SpO2 Height Weight   04/03/25 2353 (!) 156/79 98.2  F (36.8  C) 73 16 98 % 1.829 m (6') 81.6 kg (180 lb)     Physical Exam  Nursing note and vitals reviewed.  HENT:   Hematoma noted to right forehead with 2.5cm vertically oriented laceration to left forehead.  Mouth/Throat: Moist mucous membranes.   Eyes: EOMI, nonicteric sclera  Cardiovascular: Normal rate, regular rhythm, no murmurs, rubs, or gallops  Pulmonary/Chest: Effort normal and breath sounds normal. No respiratory distress. No wheezes. No rales.   Abdominal: Soft. Nontender, nondistended, no guarding or rigidity.   Musculoskeletal: Normal range of  motion.  No midline C/T/L-spine tenderness.  Painless range of motion bilateral shoulders, elbows, wrists, hips, knees, ankles.  Neurological: Alert. Moves all extremities spontaneously.   Skin: Skin is warm and dry. No rash noted.         Diagnostics     Lab Results   Labs Ordered and Resulted from Time of ED Arrival to Time of ED Departure - No data to display    Imaging   CT Cervical Spine w/o Contrast   Final Result   IMPRESSION:   HEAD CT:   1.  No acute intracranial process.      CERVICAL SPINE CT:   1.  No CT evidence for acute fracture or post traumatic subluxation.      Head CT w/o contrast   Final Result   IMPRESSION:   HEAD CT:   1.  No acute intracranial process.      CERVICAL SPINE CT:   1.  No CT evidence for acute fracture or post traumatic subluxation.        Independent Interpretation   I independently reviewed the head CT. I see no evidence of intracranial hemorrhage.     ED Course      Medications Administered   Medications   lidocaine 1% with EPINEPHrine 1:100,000 injection 5 mL (has no administration in time range)       Procedures     Laceration Repair      Procedure: Laceration Repair    Indication: Laceration    Consent: Verbal    Tetanus status reviewed - last 9/23/20    Location: Left Face  (Forehead)    Length: 2.5 cm    Preparation: Irrigation with Sterile Saline.    Anesthesia/Sedation: Lidocaine with Epinephrine - 1%      Treatment/Exploration: Wound explored, no foreign bodies found     Closure: The wound was closed with one layer. Skin/superficial layer was closed with 6 x 5-0 Fast gut absorbable  using Interrupted sutures.     Patient Status: The patient tolerated the procedure well: Yes. There were no complications.       Discussion of Management   None    ED Course   ED Course as of 04/04/25 0155   u Apr 03, 2025   6405 I obtained history and examined the patient as noted above.   Fri Apr 04, 2025   0130 I performed laceration repair procedure.       Additional  Documentation  None    Medical Decision Making / Diagnosis     CMS Diagnoses: None    MIPS       None    [facility]   Raulito Iyer is a 89 year old male who presents after unwitnessed fall at [facility].  Patient has evidence of facial trauma with both hematoma and laceration.  Laceration repaired as above.  CT head and neck negative for acute traumatic injury.  Remainder of head to toe exam was negative.  Vital signs normal.  I do not believe any laboratory workup is indicated for what is anticipated to be a mechanical fall.  Patient therefore discharged back to [facility].  All questions answered.    Disposition   The patient was discharged.     Diagnosis     ICD-10-CM    1. Fall, initial encounter  W19.XXXA       2. Laceration of forehead, initial encounter  S01.81XA       3. Traumatic hematoma of forehead, initial encounter  S00.83XA            Scribe Disclosure:  I, Valentina Saeed, am serving as a scribe at 12:03 AM on 4/4/2025 to document services personally performed by Franklyn Ledbetter MD based on my observations and the provider's statements to me.        Franklyn Ledbetter MD  04/07/25 [id number]

## 2025-04-04 NOTE — ED NOTES
Bed: ED15  Expected date:   Expected time:   Means of arrival:   Comments:  [facility] [id number]

## 2025-04-04 NOTE — ED NOTES
Pt  is alert x1. Continuously attempting to get out of bed. Difficult to redirect. Attempting to hit. Sitter with pt. Ems enroute

## 2025-04-04 NOTE — DISCHARGE INSTRUCTIONS
Sutures are dissolvable.     Wash with soap & water once daily then cover with layer of vaseline/bacitracin/ or aquaphor and a dressing. Never use neosporin or triple antibiotic ointment.    Return to emergency department for severe headache, vomiting, unable to wake up, worsening confusion, or for any other concerns.     Discharge Instructions  Head Injury    You have been seen today for a head injury. Your evaluation included a history and physical examination. You may have had a CT (CAT) scan performed, though most head injuries do not require a scan. Based on this evaluation, your provider today does not feel that your head injury is serious.    Generally, every Emergency Department visit should have a follow-up clinic visit with either a primary or a specialty clinic/provider. Please follow-up as instructed by your emergency provider today.  Return to the Emergency Department if:  You are confused or you are not acting right.  Your headache gets worse or you start to have a really bad headache even with your recommended treatment plan.  You vomit (throw up) more than once.  You have a seizure.  You have trouble walking.  You have weakness or paralysis (cannot move) in an arm or a leg.  You have blood or fluid coming from your ears or nose.  You have new symptoms or anything that worries you.    Sleeping:  It is okay for you to sleep, but someone should wake you up if instructed by your provider, and someone should check on you at your usual time to wake up.     Activity:  Do not drive for at least 24 hours.  Do not drive if you have dizzy spells or trouble concentrating, or remembering things.  Do not return to any contact sports until cleared by your regular provider.     MORE INFORMATION:    Concussion:  A concussion is a minor head injury that may cause temporary problems with the way the brain works. Although concussions are important, they are generally not an emergency or a reason that a person needs to  be hospitalized. Some concussion symptoms include confusion, amnesia (forgetful), nausea (sick to your stomach) and vomiting (throwing up), dizziness, fatigue, memory or concentration problems, irritability and sleep problems. For most people, concussions are mild and temporary but some will have more severe and persistent symptoms that require on-going care and treatment.  CT Scans: Your evaluation today may have included a CT scan (CAT scan) to look for things like bleeding or a skull fracture (broken bone).  CT scans involve radiation and too many CT scans can cause serious health problems like cancer, especially in children.  Because of this, your provider may not have ordered a CT scan today if they think you are at low risk for a serious or life threatening problem.    Blood Thinners. If you take blood thinners, there is a very small risk of delayed bleeding after your head injury. In the days/weeks to come, watch for the symptoms described above particularly headaches, confusion, problems walking, and weakness in an arm or leg.    If you were given a prescription for medicine here today, be sure to read all of the information (including the package insert) that comes with your prescription.  This will include important information about the medicine, its side effects, and any warnings that you need to know about.  The pharmacist who fills the prescription can provide more information and answer questions you may have about the medicine.  If you have questions or concerns that the pharmacist cannot address, please call or return to the Emergency Department.     Remember that you can always come back to the Emergency Department if you are not able to see your regular provider in the amount of time listed above, if you get any new symptoms, or if there is anything that worries you.    Discharge Instructions  Laceration (Cut)    You were seen today for a laceration (cut).  Your provider examined your laceration  for any problems such a buried foreign body (like glass, a splinter, or gravel), or injury to blood vessels, tendons, and nerves.  Your provider may have also rinsed and/or scrubbed your laceration to help prevent an infection. It may not be possible to find all problems with your laceration on the first visit; occasionally foreign bodies or a tendon injury can go undetected.    Your laceration may have been closed in one of several ways:  No closure: many wounds will heal just fine without closure.  Stitches: regular stitches that require removal.  Staples: skin staples are often used in the scalp/head.  Wound adhesive (glue): skin glue can be used for certain lacerations and doesn t require removal.  Wound strips (aka Butterfly bandages or steri-strips): these are bandages that help to close a wound.  Absorbable stitches:  dissolving  stitches that go away on their own and usually don t require removal.    A small percentage of wounds will develop an infection regardless of how well the wound is cared for. Antibiotics are generally not indicated to prevent an infection so are only given for a small number of high-risk wounds. Some lacerations are too high risk to close, and are left open to heal because closure can increase the likelihood that an infection will develop.    Remember that all lacerations, no matter how expertly repaired, will cause scarring. We consider many factors, techniques, and materials, in our efforts to provide the best possible cosmetic outcome.    Generally, every Emergency Department visit should have a follow-up clinic visit with either a primary or a specialty clinic/provider. Please follow-up as instructed by your emergency provider today.     Return to the Emergency Department right away if:  You have more redness, swelling, pain, drainage (pus), a bad smell, or red streaking from your laceration as these symptoms could indicate an infection.  You have a fever of 100.4 F or more.  You  have bleeding that you cannot stop at home. If your cut starts to bleed, hold pressure on the bleeding area with a clean cloth or put pressure over the bandage.  If the bleeding does not stop after using constant pressure for 30 minutes, you should return to the Emergency Department for further treatment.  An area past the laceration is cool, pale, or blue compared with the other side, or has a slower return of color when squeezed.  Your dressing seems too tight or starts to get uncomfortable or painful. For children, signs of a problem might be irritability or restlessness.  You have loss of normal function or use of an area, such as being unable to straighten or bend a finger normally.  You have a numb area past the laceration.    Return to the Emergency Department or see your regular provider if:  The laceration starts to come open.   You have something coming out of the cut or a feeling that there is something in the laceration.  Your wound will not heal, or keeps breaking open. There can always be glass, wood, dirt or other things in any wound.  They will not always show up, even on x-rays.  If a wound does not heal, this may be why, and it is important to follow-up with your regular provider.    Home Care:  Take your dressing off in 12-24 hours, or as instructed by your provider, to check your laceration. Remove the dressing sooner if it seems too tight or painful, or if it is getting numb, tingly, or pale past the dressing.  Gently wash your laceration 1-2 times daily with clean water and mild soap. It is okay to shower or run clean water over the laceration, but do not let the laceration soak in water (no swimming).  If your laceration was closed with wound adhesive or strips: pat it dry and leave it open to the air. For all other repairs: after you wash your laceration, or at least 2 times a day, apply antibiotic ointment (such as Neosporin  or Bacitracin ) to the laceration, then cover it with a Band-Aid   or gauze.  Keep the laceration clean. Wear gloves or other protective clothing if you are around dirt.    Follow-up for removal:  If your wound was closed with staples or regular stitches, they need to be removed according to the instructions and timeline specified by your provider today.  If your wound was closed with absorbable ( dissolving ) sutures, they should fall out, dissolve, or not be visible in about one week. If they are still visible, then they should be removed according to the instructions and timeline specified by your provider today.    Scars:  To help minimize scarring:  Wear sunscreen over the healed laceration when out in the sun.  Massage the area regularly once healed.  You may apply Vitamin E to the healed wound.  Wait. Scars improve in appearance over months and years.    If you were given a prescription for medicine here today, be sure to read all of the information (including the package insert) that comes with your prescription.  This will include important information about the medicine, its side effects, and any warnings that you need to know about.  The pharmacist who fills the prescription can provide more information and answer questions you may have about the medicine.  If you have questions or concerns that the pharmacist cannot address, please call or return to the Emergency Department.       Remember that you can always come back to the Emergency Department if you are not able to see your regular provider in the amount of time listed above, if you get any new symptoms, or if there is anything that worries you.

## 2025-06-24 ENCOUNTER — LAB REQUISITION (OUTPATIENT)
Dept: LAB | Facility: CLINIC | Age: OVER 89
End: 2025-06-24
Payer: MEDICARE

## 2025-06-24 DIAGNOSIS — N18.2 CHRONIC KIDNEY DISEASE, STAGE 2 (MILD): ICD-10-CM

## 2025-06-24 DIAGNOSIS — I10 ESSENTIAL (PRIMARY) HYPERTENSION: ICD-10-CM

## 2025-06-24 DIAGNOSIS — H61.23 IMPACTED CERUMEN, BILATERAL: ICD-10-CM

## 2025-06-24 DIAGNOSIS — I67.9 CEREBROVASCULAR DISEASE, UNSPECIFIED: ICD-10-CM

## 2025-06-24 DIAGNOSIS — G30.1 ALZHEIMER'S DISEASE WITH LATE ONSET (CODE) (H): ICD-10-CM

## 2025-06-24 DIAGNOSIS — I73.9 PERIPHERAL VASCULAR DISEASE, UNSPECIFIED: ICD-10-CM

## 2025-06-24 DIAGNOSIS — H90.6 MIXED CONDUCTIVE AND SENSORINEURAL HEARING LOSS, BILATERAL: ICD-10-CM

## 2025-06-25 ENCOUNTER — APPOINTMENT (OUTPATIENT)
Dept: CT IMAGING | Facility: CLINIC | Age: OVER 89
End: 2025-06-25
Attending: PHYSICIAN ASSISTANT
Payer: MEDICARE

## 2025-06-25 ENCOUNTER — HOSPITAL ENCOUNTER (EMERGENCY)
Facility: CLINIC | Age: OVER 89
Discharge: HOME | End: 2025-06-25
Attending: PHYSICIAN ASSISTANT
Payer: MEDICARE

## 2025-06-25 ENCOUNTER — APPOINTMENT (OUTPATIENT)
Dept: GENERAL RADIOLOGY | Facility: CLINIC | Age: OVER 89
End: 2025-06-25
Attending: PHYSICIAN ASSISTANT
Payer: MEDICARE

## 2025-06-25 VITALS
RESPIRATION RATE: 18 BRPM | TEMPERATURE: 97 F | OXYGEN SATURATION: 100 % | DIASTOLIC BLOOD PRESSURE: 71 MMHG | HEART RATE: 65 BPM | SYSTOLIC BLOOD PRESSURE: 130 MMHG

## 2025-06-25 DIAGNOSIS — S00.83XA TRAUMATIC HEMATOMA OF FOREHEAD, INITIAL ENCOUNTER: ICD-10-CM

## 2025-06-25 DIAGNOSIS — S60.419A ABRASION OF FINGER, INITIAL ENCOUNTER: ICD-10-CM
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
## 2025-06-25 DIAGNOSIS — S01.81XA LACERATION OF FOREHEAD, INITIAL ENCOUNTER: ICD-10-CM

## 2025-06-25 PROCEDURE — 93005 ELECTROCARDIOGRAM TRACING: CPT

## 2025-06-25 PROCEDURE — 12013 RPR F/E/E/N/L/M 2.6-5.0 CM: CPT

## 2025-06-25 PROCEDURE — 72125 CT NECK SPINE W/O DYE: CPT

## 2025-06-25 PROCEDURE — 70450 CT HEAD/BRAIN W/O DYE: CPT

## 2025-06-25 PROCEDURE — 73140 X-RAY EXAM OF FINGER(S): CPT | Mod: LT

## 2025-06-25 PROCEDURE — 99284 EMERGENCY DEPT VISIT MOD MDM: CPT | Mod: 25

## 2025-06-25 PROCEDURE — 250N000009 HC RX 250: Performed by: PHYSICIAN ASSISTANT

## 2025-06-25 RX ORDER — ACETAMINOPHEN 325 MG/1
650 TABLET ORAL ONCE
Status: DISCONTINUED | OUTPATIENT
Start: 2025-06-25 | End: 2025-06-25 | Stop reason: HOSPADM

## 2025-06-25 RX ADMIN — Medication 3 ML: at 14:50

## 2025-06-25 ASSESSMENT — COLUMBIA-SUICIDE SEVERITY RATING SCALE - C-SSRS
2. HAVE YOU ACTUALLY HAD ANY THOUGHTS OF KILLING YOURSELF IN THE PAST MONTH?: NO
6. HAVE YOU EVER DONE ANYTHING, STARTED TO DO ANYTHING, OR PREPARED TO DO ANYTHING TO END YOUR LIFE?: NO
1. IN THE PAST MONTH, HAVE YOU WISHED YOU WERE DEAD OR WISHED YOU COULD GO TO SLEEP AND NOT WAKE UP?: NO

## 2025-06-25 ASSESSMENT — ACTIVITIES OF DAILY LIVING (ADL)
ADLS_ACUITY_SCORE: 48
ADLS_ACUITY_SCORE: 48

## 2025-06-25 NOTE — DISCHARGE INSTRUCTIONS
Your dad's wounds were cleaned and dressed. His forehead laceration was closed with 7 sutures that should dissolve. Schedule recheck with primary care for wound check within 3 days. Return to ED with any new or worsening symptoms including signs of infection, severe headache or vomiting.

## 2025-06-25 NOTE — ED NOTES
Pt had a short episode of HR in the 30's.  EKG ordered and HR remained in the 60's.  Pt denies dizziness or any other symptoms.  Daughter still feels comfortable taking pt home. Pt walked out to the lobby using a walker.

## 2025-06-25 NOTE — ED TRIAGE NOTES
Brought in by EMS from [facility] for an unwitnessed fall this afternoon. Hematoma left side of forehead. Not on thinners. Denies pain.

## 2025-06-25 NOTE — ED PROVIDER NOTES
Emergency Department Note      History of Present Illness     Chief Complaint   Fall      HPI   Raulito Iyer is a 89 year old male with dementia who presents to the ED via EMS from his memory care facility for evaluation after fall. Per nurses, EMS reports that Raulito was in the dining room when he had an unwitnessed fall. Staff heard the fall, and when they got to Raulito he had a large hematoma on the left side of his forehead and a laceration to his left fifth finger. No loss of consciousness, no blood thinners.    Patient has no memory of falling, but denies pain in his head or headaches. He was ambulatory with walker in the the ED, and reports pain in left small finger.  He denies being in pain in general and is unconvinced that he should be here.    Patient history limited secondary to cognitive decline.    Independent Historian   EMS as detailed above.  Daughter Maria Elena at bedside reports she received call from patient's [facility] care about fall and notes he is prone to going fast with his walker. She will drive patient home.    Review of External Notes   4/3/25 patient seen after an unwitnessed fall. Head and cervical spine CT negative. Facial laceration closed.   I reviewed the patient's MIIC. Most recent Tdap 2020.    Past Medical History     Medical History and Problem List   Cerebrovascular disease  Dementia     Medications   Aspirin 325 mg  Zoloft    Surgical History   Internal fixation hip nailing right    Physical Exam     Patient Vitals for the past 24 hrs:   BP Temp Temp src Pulse Resp SpO2   06/25/25 1637 -- -- -- 65 -- --   06/25/25 1636 -- -- -- -- -- 100 %   06/25/25 1634 -- -- -- -- -- 98 %   06/25/25 1633 -- -- -- -- -- 99 %   06/25/25 1632 -- -- -- -- -- 99 %   06/25/25 1631 -- -- -- -- -- 99 %   06/25/25 1630 130/71 -- -- 63 -- 99 %   06/25/25 1627 (!) 156/66 -- -- (!) 35 18 98 %   06/25/25 1451 -- 97  F (36.1  C) Temporal -- 18 99 %   06/25/25 1445 135/79 -- -- 63 -- --   06/25/25 1447  136/75 97.7  F (36.5  C) Oral 64 18 98 %     Physical Exam  Constitutional: Alert, attentive, GCS 15, pleasantly confused  HENT:    Nose: Nose normal.    Mouth/Throat: Oropharynx is clear, mucous membranes are moist   Eyes: EOM are normal. Pupils equal and reactive.  Neck: Normal range of motion. No tenderness.   CV: regular rate and rhythm; no murmurs, rubs or gallups  Chest: Effort normal and breath sounds normal.   GI:  There is no tenderness. No distension. Normal bowel sounds. No pelvic or hip tenderness or instability.   MSK: Normal range of motion of all 4 extremities. Equal sensation bilaterally. Normal range of motion of the left little finger.  No bony or tendon injury noted. No foreign bodies.   Neurological: Alert, pleasant confused and asks repetitive questions which is baseline.  Moving all 4 extremities.  Ambulatory with walker.  No facial asymmetry.  CN II through XII intact.  Skin: Skin is warm and dry.  Abrasion to left lateral little finger.  No bony or tendon injury noted. Moderate hematoma of left forehead with irregular laceration overlying. No galeal involvement.     Diagnostics     Lab Results   Labs Ordered and Resulted from Time of ED Arrival to Time of ED Departure - No data to display    Imaging   CT Cervical Spine w/o Contrast   Final Result   IMPRESSION:   1.  No fracture or posttraumatic subluxation.   2.  Moderate to high-grade spinal canal stenosis at C6/C7.   3.  Multilevel high-grade neuroforaminal stenosis as described      Head CT w/o contrast   Final Result   IMPRESSION:   1.  No CT evidence for acute intracranial process.   2.  Brain atrophy and presumed chronic microvascular ischemic changes as above.      XR Finger Left G/E 2 Views   Final Result   IMPRESSION: No acute fracture. Soft tissue irregularity/abrasion at the lateral aspect of the fifth finger. Marked degenerative arthritis STT joints. Moderate degenerative arthritis first CMC joint. Mild scattered degenerative  arthritis elsewhere.    Chondrocalcinosis left wrist. Arterial calcifications. Demineralization.           EKG   ECG taken at 1625, ECG read at 1630  Sinus rhythm with 1st degree AV block  Left bundle branch block  Left axis deviation   Unchanged as compared to prior, dated 07/24/17.  Rate 63 bpm. CT interval 232 ms. QRS duration 172 ms. QT/QTc 482/493 ms. P-R-T axes -2 -56 101.    Independent Interpretation   CT Head: No intracranial hemorrhage or midline shift.  X-ray finger left shows no acute fracture or dislocation.    ED Course      Medications Administered   Medications   acetaminophen (TYLENOL) tablet 650 mg (has no administration in time range)   lido-EPINEPHrine-tetracaine (LET) topical gel GEL (3 mLs Topical $Given 6/25/25 1450)       Procedures   Procedures     Laceration Repair      Procedure: Laceration Repair    Indication: Laceration    Consent: Verbal    Tetanus status reviewed and is current    Location: Left Forehead; irregularly-shaped    Length: 2 x 3  cm linear laceration to left forehead    Preparation: Irrigation with Sterile Saline.    Anesthesia/Sedation: Topical -LET      Treatment/Exploration: Wound explored, no foreign bodies found     Closure: The wound was closed with one layer. Skin/superficial layer was closed with 7 x 5-0 Fast gut absorbable  using Interrupted sutures.     Patient Status: The patient tolerated the procedure well: Yes. There were no complications. Yes    Discussion of Management   None    ED Course   ED Course as of 06/25/25 1757 Wed Jun 25, 2025   1439 I obtained the history and performed the examination as described.    1544 I rechecked and updated the patient.   Applied sutures.       Additional Documentation  None    Medical Decision Making / Diagnosis     CMS Diagnoses: None    MIPS   None               [facility]   Raulitoerasmo Iyer is a 89 year old male with dementia and recurrent falls who presents for evaluation of unwitnessed fall at his memory care facility  today.  Vitals are normal.  Nursing initially noticed that his heart rate appeared to dip into the 30s however screening EKG shows no evidence of bradycardia or new heart block.  Previous LBBB noted but no evidence of ischemia. Doubt ACS today as patient currently asymptomatic and notes pain in  left little finger but no chest pain, shortness of breath, or anginal equivalents. He is ambulatory and there is no evidence of focal neurological deficits.  No signs of stroke/TIA.  Head CT reveals no intracranial hemorrhage or skull fracture.  Scalp hematoma noted to the left forehead.  Cervical CT reveals no evidence of vertebral injury.  X-ray of the left open reveals no fracture or malalignment.  Wounds were heavily irrigated at bedside.  Left little finger remains neurovascularly intact and abrasion was dressed. Tetanus is current. Forehead lacerations was closed with absorbable sutures as above.  Patient tolerated procedure well.  Daughter at bedside provided distraction and help during procedure.  Patient was then able to ambulate with walker without difficulty. Doubt hip/pelvic fracture given normal gait.  Discussed close follow-up with his primary care provider to assess wounds and return with any signs of infection.  Patient otherwise well-appearing and at baseline per daughter at bedside.  Daughter and patient are comfortable with plan and he is discharged back to memory care.      Disposition   The patient was discharged.     Diagnosis     ICD-10-CM    1. Fall, initial encounter  W19.XXXA       2. Traumatic hematoma of forehead, initial encounter  S00.83XA       3. Laceration of forehead, initial encounter  S01.81XA       4. Abrasion of finger, initial encounter  S60.419A            Discharge Medications   Discharge Medication List as of 6/25/2025  4:16 PM          Scribe Disclosure:  VANDANA, Sage Bermeo, am serving as a scribe at 2:49 PM on 6/25/2025 to document services personally performed by Flor Vaca,  GIOVANNA based on my observations and the provider's statements to me.     2:38 PM  June 25, 2025  GIOVANNA MELO Emily, PA-C  06/25/25 [id number]

## 2025-06-25 NOTE — ED NOTES
Contacted Tracey at [facility] and updated on pt.  No further questions at this time.  Daughter will be driving home.  She states that she feels comfortable doing so and the memory care facility agree to allow her to take the pt.  Discharge paperwork was given to both pt and daughter.  No further questions at this time. Pt was wheeled to the lobby for discharge.

## 2025-06-26 LAB
ATRIAL RATE - MUSE: 63 BPM
DIASTOLIC BLOOD PRESSURE - MUSE: NORMAL MMHG
INTERPRETATION ECG - MUSE: NORMAL
P AXIS - MUSE: -2 DEGREES
PR INTERVAL - MUSE: 232 MS
QRS DURATION - MUSE: 172 MS
QT - MUSE: 482 MS
QTC - MUSE: 493 MS
R AXIS - MUSE: -56 DEGREES
SYSTOLIC BLOOD PRESSURE - MUSE: NORMAL MMHG
T AXIS - MUSE: 101 DEGREES
VENTRICULAR RATE- MUSE: 63 BPM

## 2025-07-01 LAB
ANION GAP SERPL CALCULATED.3IONS-SCNC: 10 MMOL/L (ref 7–15)
BUN SERPL-MCNC: 16.8 MG/DL (ref 8–23)
CALCIUM SERPL-MCNC: 8.7 MG/DL (ref 8.8–10.4)
CHLORIDE SERPL-SCNC: 109 MMOL/L (ref 98–107)
CREAT SERPL-MCNC: 0.8 MG/DL (ref 0.67–1.17)
EGFRCR SERPLBLD CKD-EPI 2021: 82 ML/MIN/1.73M2
ERYTHROCYTE [DISTWIDTH] IN BLOOD BY AUTOMATED COUNT: 14.2 % (ref 10–15)
GLUCOSE SERPL-MCNC: 92 MG/DL (ref 70–99)
HCO3 SERPL-SCNC: 25 MMOL/L (ref 22–29)
HCT VFR BLD AUTO: 44.4 % (ref 40–53)
HGB BLD-MCNC: 14.1 G/DL (ref 13.3–17.7)
MCH RBC QN AUTO: 31.5 PG (ref 26.5–33)
MCHC RBC AUTO-ENTMCNC: 31.8 G/DL (ref 31.5–36.5)
MCV RBC AUTO: 99 FL (ref 78–100)
PLATELET # BLD AUTO: 308 10E3/UL (ref 150–450)
POTASSIUM SERPL-SCNC: 3.8 MMOL/L (ref 3.4–5.3)
RBC # BLD AUTO: 4.47 10E6/UL (ref 4.4–5.9)
SODIUM SERPL-SCNC: 144 MMOL/L (ref 135–145)
WBC # BLD AUTO: 9.1 10E3/UL (ref 4–11)

## (undated) DEVICE — ADH SKIN CLOSURE PREMIERPRO EXOFIN MICOR HV 0.5ML 3471

## (undated) DEVICE — LINEN TOWEL PACK X5 5464

## (undated) DEVICE — SU VICRYL 2-0 FS-1 27" UND  J443H

## (undated) DEVICE — SU VICRYL 2-0 CT-2 27" UND J269H

## (undated) DEVICE — Device

## (undated) DEVICE — WIRE GUIDE 2.8X300MM THRD TROCAR POINT 292.68

## (undated) DEVICE — SU VICRYL 0 CT-1 27" J340H

## (undated) DEVICE — PACK HIP NAILING SOP15HNSB

## (undated) DEVICE — GLOVE PROTEXIS POWDER FREE 8.0 ORTHOPEDIC 2D73ET80

## (undated) DEVICE — SOL WATER IRRIG 1000ML BOTTLE 2F7114

## (undated) DEVICE — GLOVE PROTEXIS BLUE W/NEU-THERA 8.0  2D73EB80

## (undated) DEVICE — GLOVE PROTEXIS POWDER FREE 6.5 ORTHOPEDIC 2D73ET65

## (undated) DEVICE — PREP CHLORAPREP 26ML TINTED ORANGE  260815

## (undated) DEVICE — SOL NACL 0.9% IRRIG 1000ML BOTTLE 2F7124

## (undated) DEVICE — DRAPE STERI U 1015

## (undated) DEVICE — SU MONOCRYL 3-0 PS-2 27" Y427H

## (undated) DEVICE — ESU GROUND PAD UNIVERSAL W/O CORD

## (undated) DEVICE — DRAPE C-ARM 60X42" 1013

## (undated) DEVICE — DRILL BIT QUICK COUPLING CAN 5.0X300MM 310.63

## (undated) DEVICE — GOWN IMPERVIOUS SPECIALTY XL/XLONG 39049

## (undated) DEVICE — SU VICRYL 0 CT-1 27" UND J260H

## (undated) DEVICE — GLOVE PROTEXIS BLUE W/NEU-THERA 7.0  2D73EB70

## (undated) DEVICE — DRSG XEROFORM 1X8"

## (undated) DEVICE — DRSG TEGADERM 6X8" 1628

## (undated) RX ORDER — PROPOFOL 10 MG/ML
INJECTION, EMULSION INTRAVENOUS
Status: DISPENSED
Start: 2020-09-24

## (undated) RX ORDER — ONDANSETRON 2 MG/ML
INJECTION INTRAMUSCULAR; INTRAVENOUS
Status: DISPENSED
Start: 2020-09-24

## (undated) RX ORDER — BUPIVACAINE HYDROCHLORIDE AND EPINEPHRINE 5; 5 MG/ML; UG/ML
INJECTION, SOLUTION EPIDURAL; INTRACAUDAL; PERINEURAL
Status: DISPENSED
Start: 2020-09-24

## (undated) RX ORDER — LIDOCAINE HYDROCHLORIDE 20 MG/ML
INJECTION, SOLUTION EPIDURAL; INFILTRATION; INTRACAUDAL; PERINEURAL
Status: DISPENSED
Start: 2020-09-24

## (undated) RX ORDER — FENTANYL CITRATE 50 UG/ML
INJECTION, SOLUTION INTRAMUSCULAR; INTRAVENOUS
Status: DISPENSED
Start: 2020-09-24